# Patient Record
Sex: MALE | Race: BLACK OR AFRICAN AMERICAN | NOT HISPANIC OR LATINO | Employment: OTHER | URBAN - METROPOLITAN AREA
[De-identification: names, ages, dates, MRNs, and addresses within clinical notes are randomized per-mention and may not be internally consistent; named-entity substitution may affect disease eponyms.]

---

## 2017-01-10 ENCOUNTER — TELEPHONE (OUTPATIENT)
Dept: FAMILY MEDICINE | Facility: CLINIC | Age: 67
End: 2017-01-10

## 2017-01-10 NOTE — TELEPHONE ENCOUNTER
Spoke to patient, informed him hydroxyzine not covered by insurance. Patient states he is sleeping all right and he will call if he have trouble sleeping

## 2017-01-30 ENCOUNTER — LAB VISIT (OUTPATIENT)
Dept: LAB | Facility: HOSPITAL | Age: 67
End: 2017-01-30
Attending: INTERNAL MEDICINE
Payer: MEDICARE

## 2017-01-30 DIAGNOSIS — C23 GALLBLADDER CANCER: ICD-10-CM

## 2017-01-30 LAB
ALBUMIN SERPL BCP-MCNC: 4.1 G/DL
ALP SERPL-CCNC: 71 U/L
ALT SERPL W/O P-5'-P-CCNC: 7 U/L
ANION GAP SERPL CALC-SCNC: 7 MMOL/L
ANISOCYTOSIS BLD QL SMEAR: SLIGHT
AST SERPL-CCNC: 16 U/L
BASOPHILS # BLD AUTO: 0.02 K/UL
BASOPHILS NFR BLD: 0.3 %
BILIRUB SERPL-MCNC: 0.5 MG/DL
BUN SERPL-MCNC: 14 MG/DL
CALCIUM SERPL-MCNC: 9.7 MG/DL
CEA SERPL-MCNC: 3.4 NG/ML
CHLORIDE SERPL-SCNC: 105 MMOL/L
CO2 SERPL-SCNC: 28 MMOL/L
CREAT SERPL-MCNC: 1.5 MG/DL
DIFFERENTIAL METHOD: ABNORMAL
EOSINOPHIL # BLD AUTO: 0.4 K/UL
EOSINOPHIL NFR BLD: 6.2 %
ERYTHROCYTE [DISTWIDTH] IN BLOOD BY AUTOMATED COUNT: 15.5 %
EST. GFR  (AFRICAN AMERICAN): 55 ML/MIN/1.73 M^2
EST. GFR  (NON AFRICAN AMERICAN): 48 ML/MIN/1.73 M^2
GLUCOSE SERPL-MCNC: 94 MG/DL
HCT VFR BLD AUTO: 37.9 %
HGB BLD-MCNC: 12.2 G/DL
HYPOCHROMIA BLD QL SMEAR: ABNORMAL
LYMPHOCYTES # BLD AUTO: 1.9 K/UL
LYMPHOCYTES NFR BLD: 30.8 %
MCH RBC QN AUTO: 21.7 PG
MCHC RBC AUTO-ENTMCNC: 32.2 %
MCV RBC AUTO: 67 FL
MONOCYTES # BLD AUTO: 0.7 K/UL
MONOCYTES NFR BLD: 11.7 %
NEUTROPHILS # BLD AUTO: 3.1 K/UL
NEUTROPHILS NFR BLD: 51 %
OVALOCYTES BLD QL SMEAR: ABNORMAL
PLATELET # BLD AUTO: 266 K/UL
PMV BLD AUTO: 9.2 FL
POIKILOCYTOSIS BLD QL SMEAR: SLIGHT
POTASSIUM SERPL-SCNC: 4.5 MMOL/L
PROT SERPL-MCNC: 7.8 G/DL
RBC # BLD AUTO: 5.63 M/UL
SODIUM SERPL-SCNC: 140 MMOL/L
WBC # BLD AUTO: 6.14 K/UL

## 2017-01-30 PROCEDURE — 80053 COMPREHEN METABOLIC PANEL: CPT

## 2017-01-30 PROCEDURE — 82378 CARCINOEMBRYONIC ANTIGEN: CPT

## 2017-01-30 PROCEDURE — 86301 IMMUNOASSAY TUMOR CA 19-9: CPT

## 2017-01-30 PROCEDURE — 85025 COMPLETE CBC W/AUTO DIFF WBC: CPT

## 2017-01-30 PROCEDURE — 36415 COLL VENOUS BLD VENIPUNCTURE: CPT

## 2017-01-31 LAB — CANCER AG19-9 SERPL-ACNC: 12 U/ML

## 2017-02-03 ENCOUNTER — OFFICE VISIT (OUTPATIENT)
Dept: HEMATOLOGY/ONCOLOGY | Facility: CLINIC | Age: 67
End: 2017-02-03
Payer: MEDICARE

## 2017-02-03 VITALS
TEMPERATURE: 99 F | BODY MASS INDEX: 32 KG/M2 | OXYGEN SATURATION: 97 % | HEIGHT: 69 IN | WEIGHT: 216.06 LBS | SYSTOLIC BLOOD PRESSURE: 116 MMHG | DIASTOLIC BLOOD PRESSURE: 62 MMHG | HEART RATE: 70 BPM

## 2017-02-03 DIAGNOSIS — C23 GALLBLADDER CANCER: Primary | ICD-10-CM

## 2017-02-03 PROCEDURE — 3074F SYST BP LT 130 MM HG: CPT | Mod: S$GLB,,, | Performed by: INTERNAL MEDICINE

## 2017-02-03 PROCEDURE — 99999 PR PBB SHADOW E&M-EST. PATIENT-LVL III: CPT | Mod: PBBFAC,,, | Performed by: INTERNAL MEDICINE

## 2017-02-03 PROCEDURE — 3078F DIAST BP <80 MM HG: CPT | Mod: S$GLB,,, | Performed by: INTERNAL MEDICINE

## 2017-02-03 PROCEDURE — 1126F AMNT PAIN NOTED NONE PRSNT: CPT | Mod: S$GLB,,, | Performed by: INTERNAL MEDICINE

## 2017-02-03 PROCEDURE — 1157F ADVNC CARE PLAN IN RCRD: CPT | Mod: S$GLB,,, | Performed by: INTERNAL MEDICINE

## 2017-02-03 PROCEDURE — 1159F MED LIST DOCD IN RCRD: CPT | Mod: S$GLB,,, | Performed by: INTERNAL MEDICINE

## 2017-02-03 PROCEDURE — 1160F RVW MEDS BY RX/DR IN RCRD: CPT | Mod: S$GLB,,, | Performed by: INTERNAL MEDICINE

## 2017-02-03 PROCEDURE — 99213 OFFICE O/P EST LOW 20 MIN: CPT | Mod: S$GLB,,, | Performed by: INTERNAL MEDICINE

## 2017-02-03 NOTE — MR AVS SNAPSHOT
[facility]  [address] [place] [address]  Phone: [phone]                  Evens Aguilar   2/3/2017 3:00 PM   Office Visit    Description:  Male : 1950   Provider:  Tish Rodriguez MD   Department:  [facility]           Reason for Visit     Follow-up           Diagnoses this Visit        Comments    Gallbladder cancer    -  Primary            To Do List           Future Appointments        Provider Department Dept Phone    2017 2:30 PM LAB, [facility] [phone]    2017 3:00 PM Tish Rodriguez MD [facility] [phone]      Goals (5 Years of Data)     None      Follow-Up and Disposition     Return in about 2 months (around 4/3/2017).      [facility]     [facility] -  Assistance  Registered nurses in the [facility] provide clinical advisement, health education, appointment booking, and other advisory services.  Call for this free service at [phone].             Medications           Message regarding Medications     Verify the changes and/or additions to your medication regime listed below are the same as discussed with your clinician today.  If any of these changes or additions are incorrect, please notify your healthcare provider.             Verify that the below list of medications is an accurate representation of the medications you are currently taking.  If none reported, the list may be blank. If incorrect, please contact your healthcare provider. Carry this list with you in case of emergency.           Current Medications     albuterol-ipratropium 2.5mg-0.5mg/3mL (DUO-NEB) 0.5 mg-3 mg(2.5 mg base)/3 mL nebulizer solution Take 3 mLs by nebulization every 6 (six) hours as needed for Wheezing.    amlodipine (NORVASC) 10 MG tablet Take 1 tablet (10 mg total) by mouth once daily.    blood sugar diagnostic (TRUETEST TEST  "STRIPS) Strp CHECK SUGAR DAILY. TESTING STRIPS AND LANCETS    blood-glucose meter (FREESTYLE SYSTEM KIT) kit Use as instructed. TRUE TEST GLUCOMETER. NOT FREESTLYE.    budesonide-formoterol 160-4.5 mcg (SYMBICORT) 160-4.5 mcg/actuation HFAA Inhale 2 puffs into the lungs every 12 (twelve) hours. GENERIC EQUIVALENT IS OKAY    folic acid (FOLVITE) 1 MG tablet Take 1 tablet (1,000 mcg total) by mouth once daily.    glimepiride (AMARYL) 1 MG tablet Take 1 tablet (1 mg total) by mouth before breakfast.    hydrOXYzine pamoate (VISTARIL) 25 MG Cap Take 1 capsule (25 mg total) by mouth nightly as needed.    pravastatin (PRAVACHOL) 20 MG tablet Take 1 tablet (20 mg total) by mouth once daily.    tamsulosin (FLOMAX) 0.4 mg Cp24 TAKE 1 CAPSULE(S) BY MOUTH daily    isosorbide mononitrate (IMDUR) 30 MG 24 hr tablet TAKE 1 TABLET(S) BY MOUTH daily           Clinical Reference Information           Your Vitals Were     BP Pulse Temp Height Weight SpO2    116/62 (BP Location: Left arm, Patient Position: Sitting, BP Method: Manual) 70 98.6 °F (37 °C) (Oral) 5' 9" (1.753 m) 98 kg (216 lb 0.8 oz) 97%    BMI                31.91 kg/m2          Blood Pressure          Most Recent Value    BP  116/62      Allergies as of 2/3/2017     No Known Allergies      Immunizations Administered on Date of Encounter - 2/3/2017     None      Orders Placed During Today's Visit     Future Labs/Procedures Expected by Expires    CBC auto differential  2/3/2017 2/3/2018    Comprehensive metabolic panel  2/3/2017 2/3/2018    Ferritin  2/3/2017 2/3/2018    Iron and TIBC  2/3/2017 2/3/2018      Language Assistance Services     ATTENTION: Language assistance services are available, free of charge. Please call [phone].      ATENCIÓN: Si habla miguel ángelya, tiene a paul disposición servicios gratuitos de asistencia lingüística. Llame al [phone].     CHÚ Ý: N?u b?n nói Ti?ng Vi?t, có các d?ch v? h? tr? ngôn ng? mi?n phí dành cho b?n. G?i s? " [phone].         [facility] complies with applicable Federal civil rights laws and does not discriminate on the basis of race, color, national origin, age, disability, or sex.

## 2017-03-17 RX ORDER — AMLODIPINE BESYLATE 10 MG/1
TABLET ORAL
Qty: 90 TABLET | Refills: 0 | Status: SHIPPED | OUTPATIENT
Start: 2017-03-17 | End: 2017-07-19 | Stop reason: SDUPTHER

## 2017-03-17 RX ORDER — PRAVASTATIN SODIUM 20 MG/1
TABLET ORAL
Qty: 90 TABLET | Refills: 0 | Status: SHIPPED | OUTPATIENT
Start: 2017-03-17 | End: 2017-06-05 | Stop reason: SDUPTHER

## 2017-03-22 ENCOUNTER — OFFICE VISIT (OUTPATIENT)
Dept: FAMILY MEDICINE | Facility: CLINIC | Age: 67
End: 2017-03-22
Payer: MEDICARE

## 2017-03-22 VITALS
SYSTOLIC BLOOD PRESSURE: 130 MMHG | BODY MASS INDEX: 30.9 KG/M2 | DIASTOLIC BLOOD PRESSURE: 70 MMHG | WEIGHT: 215.81 LBS | HEIGHT: 70 IN | OXYGEN SATURATION: 97 % | TEMPERATURE: 99 F | HEART RATE: 68 BPM

## 2017-03-22 DIAGNOSIS — D56.0 ALPHA-THALASSEMIA: ICD-10-CM

## 2017-03-22 DIAGNOSIS — D57.3 SICKLE-CELL TRAIT: Primary | ICD-10-CM

## 2017-03-22 DIAGNOSIS — E11.9 TYPE 2 DIABETES MELLITUS WITHOUT COMPLICATION, WITHOUT LONG-TERM CURRENT USE OF INSULIN: ICD-10-CM

## 2017-03-22 DIAGNOSIS — E11.29 TYPE II OR UNSPECIFIED TYPE DIABETES MELLITUS WITH RENAL MANIFESTATIONS, NOT STATED AS UNCONTROLLED(250.40): ICD-10-CM

## 2017-03-22 DIAGNOSIS — J44.1 COPD EXACERBATION: ICD-10-CM

## 2017-03-22 DIAGNOSIS — F10.20 ALCOHOLISM: ICD-10-CM

## 2017-03-22 PROCEDURE — 1159F MED LIST DOCD IN RCRD: CPT | Mod: S$GLB,,, | Performed by: FAMILY MEDICINE

## 2017-03-22 PROCEDURE — 1157F ADVNC CARE PLAN IN RCRD: CPT | Mod: S$GLB,,, | Performed by: FAMILY MEDICINE

## 2017-03-22 PROCEDURE — 3044F HG A1C LEVEL LT 7.0%: CPT | Mod: S$GLB,,, | Performed by: FAMILY MEDICINE

## 2017-03-22 PROCEDURE — 3075F SYST BP GE 130 - 139MM HG: CPT | Mod: S$GLB,,, | Performed by: FAMILY MEDICINE

## 2017-03-22 PROCEDURE — 99499 UNLISTED E&M SERVICE: CPT | Mod: S$GLB,,, | Performed by: FAMILY MEDICINE

## 2017-03-22 PROCEDURE — 3078F DIAST BP <80 MM HG: CPT | Mod: S$GLB,,, | Performed by: FAMILY MEDICINE

## 2017-03-22 PROCEDURE — 99214 OFFICE O/P EST MOD 30 MIN: CPT | Mod: S$GLB,,, | Performed by: FAMILY MEDICINE

## 2017-03-22 PROCEDURE — 99999 PR PBB SHADOW E&M-EST. PATIENT-LVL III: CPT | Mod: PBBFAC,,, | Performed by: FAMILY MEDICINE

## 2017-03-22 PROCEDURE — 4010F ACE/ARB THERAPY RXD/TAKEN: CPT | Mod: S$GLB,,, | Performed by: FAMILY MEDICINE

## 2017-03-22 PROCEDURE — 1160F RVW MEDS BY RX/DR IN RCRD: CPT | Mod: S$GLB,,, | Performed by: FAMILY MEDICINE

## 2017-03-22 RX ORDER — LISINOPRIL 2.5 MG/1
2.5 TABLET ORAL DAILY
Qty: 90 TABLET | Refills: 1 | Status: SHIPPED | OUTPATIENT
Start: 2017-03-22 | End: 2017-11-01 | Stop reason: SDUPTHER

## 2017-03-22 NOTE — PROGRESS NOTES
"Subjective:       Patient ID: Evens Aguilar is a 67 y.o. male.    Chief Complaint: 3 month Diabetes Follow Up    HPI Comments:   Subjective:    Evens Aguilar is a 67 y.o. male who presents for follow-up of Type 2 diabetes mellitus.  Current symptoms/problems include none and have been stable. Symptoms have been present for N/A . He denies polyuria, polypdipisia, nausea, vomiting.     Known diabetic complications: nephropathy  Cardiovascular risk factors: advanced age (older than 55 for men, 65 for women), diabetes mellitus, dyslipidemia, male gender, obesity (BMI >= 30 kg/m2) and sedentary lifestyle  Current diabetic medications include oral agent (monotherapy): glimepiride 1 mg daily.   Eye exam current (within one year): yes; he is due for an exam. It is scheduled on 4/11/17  Weight trend: stable  Prior visit with dietician: yes -   Current diet: in general, a "healthy" diet    Current exercise: exercising at [facility] gym    Current monitoring regimen: home blood tests - one times daily  Home blood sugar records: fasting range: 100-s-110's  Any episodes of hypoglycemia? no    Is He on ACE inhibitor or angiotensin II receptor blocker?   No       Review of Systems    Objective:       Vitals:    03/22/17 1513   BP: 130/70   Pulse: 68   Temp: 99 °F (37.2 °C)   TempSrc: Oral   SpO2: 97%   Weight: 97.9 kg (215 lb 13.3 oz)   Height: 5' 10" (1.778 m)       Physical Exam   Constitutional: He is oriented to person, place, and time. He appears well-developed and well-nourished. No distress.   HENT:   Head: Normocephalic and atraumatic.   Eyes: Conjunctivae are normal.   Neck: Normal range of motion. Neck supple. Carotid bruit is not present.   Cardiovascular: Normal rate, regular rhythm and normal heart sounds.  Exam reveals no gallop and no friction rub.    No murmur heard.  Pulmonary/Chest: Effort normal and breath sounds normal. No respiratory distress. He has no wheezes. He has no rales.   Musculoskeletal: " He exhibits no edema.   Lymphadenopathy:     He has no cervical adenopathy.   Neurological: He is alert and oriented to person, place, and time.   Skin: He is not diaphoretic.   Psychiatric: He has a normal mood and affect.         Protective Sensation (w/ 10 gram monofilament):  Right: Intact  Left: Intact    Visual Inspection:  Nails Intact - without Evidence of Foot Deformity- Bilateral, Dry Skin -  Bilateral and Onychomycosis -  Bilateral    Pedal Pulses:   Right: Present  Left: Present    Posterior tibialis:   Right:Present  Left: Present      Assessment:       1. Sickle-cell trait    2. Type 2 diabetes mellitus without complication, without long-term current use of insulin    3. Alpha-thalassemia    4. Type II or unspecified type diabetes mellitus with renal manifestations, not stated as uncontrolled    5. Alcoholism    6. COPD exacerbation        Plan:           Evens was seen today for 3 month diabetes follow up.    Diagnoses and all orders for this visit:    Sickle-cell trait  Stable    Type 2 diabetes mellitus without complication, without long-term current use of insulin  -     Comprehensive metabolic panel; Future  -     Lipid panel; Future  -     Microalbumin/creatinine urine ratio; Future  -     Hemoglobin A1c; Future  -     lisinopril (PRINIVIL,ZESTRIL) 2.5 MG tablet; Take 1 tablet (2.5 mg total) by mouth once daily.  Ordered labs and starting him on lisinopril to p. rotect his kidneys. Foot exam was one was done today    Alpha-thalassemia  Stable    Type II or unspecified type diabetes mellitus with renal manifestations, not stated as uncontrolled  -     Comprehensive metabolic panel; Future  -     Microalbumin/creatinine urine ratio; Future    Alcoholism  -     Comprehensive metabolic panel; Future  Will check liver enzymes    COPD exacerbation  Continue combivent prn and symbicort daily     Other orders  -     lisinopril (PRINIVIL,ZESTRIL) 2.5 MG tablet; Take 1 tablet (2.5 mg total) by mouth once  daily.

## 2017-03-22 NOTE — MR AVS SNAPSHOT
[facility]  [address]  [address]  [place] [address]  Phone: [phone]  Fax: [phone]                  Evens Aguilar   3/22/2017 3:00 PM   Office Visit    Description:  Male : 1950   Provider:  Anahy Matthew MD   Department:  [facility]           Reason for Visit     3 month Diabetes Follow Up           Diagnoses this Visit        Comments    Sickle-cell trait    -  Primary     Type 2 diabetes mellitus without complication, without long-term current use of insulin         Alpha-thalassemia         Type II or unspecified type diabetes mellitus with renal manifestations, not stated as uncontrolled         Alcoholism         COPD exacerbation                To Do List           Future Appointments        Provider Department Dept Phone    2017 2:30 PM [facility] [phone]    2017 3:00 PM Tish Rodriguez MD [facility] [phone]    2017 2:30 PM Nathaniel Calhoun OD [place] [facility] [phone]      Goals (5 Years of Data)     None      Follow-Up and Disposition     Return in about 2 months (around 2017) for labs.       These Medications        Disp Refills Start End    lisinopril (PRINIVIL,ZESTRIL) 2.5 MG tablet 90 tablet 1 3/22/2017 3/22/2018    Take 1 tablet (2.5 mg total) by mouth once daily. - Oral    [facility] Ph #: [phone]         [facility]     [facility] -  Assistance  Registered nurses in the [facility] provide clinical advisement, health education, appointment booking, and other advisory services.  Call for this free service at [phone].             Medications           Message regarding Medications     Verify the changes and/or additions to your medication regime listed below are the same as discussed with your clinician today.  If any  of these changes or additions are incorrect, please notify your healthcare provider.        START taking these NEW medications        Refills    lisinopril (PRINIVIL,ZESTRIL) 2.5 MG tablet 1    Sig: Take 1 tablet (2.5 mg total) by mouth once daily.    Class: Normal    Route: Oral           Verify that the below list of medications is an accurate representation of the medications you are currently taking.  If none reported, the list may be blank. If incorrect, please contact your healthcare provider. Carry this list with you in case of emergency.           Current Medications     albuterol-ipratropium 2.5mg-0.5mg/3mL (DUO-NEB) 0.5 mg-3 mg(2.5 mg base)/3 mL nebulizer solution Take 3 mLs by nebulization every 6 (six) hours as needed for Wheezing.    amlodipine (NORVASC) 10 MG tablet TAKE ONE TABLET BY MOUTH ONCE DAILY    blood sugar diagnostic (TRUETEST TEST STRIPS) Strp CHECK SUGAR DAILY. TESTING STRIPS AND LANCETS    blood-glucose meter (FREESTYLE SYSTEM KIT) kit Use as instructed. TRUE TEST GLUCOMETER. NOT FREESTLYE.    budesonide-formoterol 160-4.5 mcg (SYMBICORT) 160-4.5 mcg/actuation HFAA Inhale 2 puffs into the lungs every 12 (twelve) hours. GENERIC EQUIVALENT IS OKAY    folic acid (FOLVITE) 1 MG tablet Take 1 tablet (1,000 mcg total) by mouth once daily.    glimepiride (AMARYL) 1 MG tablet Take 1 tablet (1 mg total) by mouth before breakfast.    hydrOXYzine pamoate (VISTARIL) 25 MG Cap Take 1 capsule (25 mg total) by mouth nightly as needed.    isosorbide mononitrate (IMDUR) 30 MG 24 hr tablet TAKE 1 TABLET(S) BY MOUTH daily    pravastatin (PRAVACHOL) 20 MG tablet TAKE ONE TABLET BY MOUTH ONCE DAILY    tamsulosin (FLOMAX) 0.4 mg Cp24 TAKE 1 CAPSULE(S) BY MOUTH daily    lisinopril (PRINIVIL,ZESTRIL) 2.5 MG tablet Take 1 tablet (2.5 mg total) by mouth once daily.           Clinical Reference Information           Your Vitals Were     BP Pulse Temp Height Weight SpO2    130/70 (BP Location: Right arm, Patient  "Position: Sitting, BP Method: Manual) 68 99 °F (37.2 °C) (Oral) 5' 10" (1.778 m) 97.9 kg (215 lb 13.3 oz) 97%    BMI                30.97 kg/m2          Blood Pressure          Most Recent Value    BP  130/70      Allergies as of 3/22/2017     No Known Allergies      Immunizations Administered on Date of Encounter - 3/22/2017     None      Orders Placed During Today's Visit     Future Labs/Procedures Expected by Expires    Comprehensive metabolic panel  3/22/2017 5/21/2018    Hemoglobin A1c  3/22/2017 5/21/2018    Lipid panel  3/22/2017 5/21/2018    Microalbumin/creatinine urine ratio  5/22/2017 5/21/2018      Language Assistance Services     ATTENTION: Language assistance services are available, free of charge. Please call [phone].      ATENCIÓN: Si tamy mccall, tiene a paul disposición servicios gratuitos de asistencia lingüística. Llame al [phone].     CHÚ Ý: N?u b?n nói Ti?ng Vi?t, có các d?ch v? h? tr? ngôn ng? mi?n phí dành cho b?n. G?i s? [phone].         [facility] complies with applicable Federal civil rights laws and does not discriminate on the basis of race, color, national origin, age, disability, or sex.        "

## 2017-04-04 ENCOUNTER — LAB VISIT (OUTPATIENT)
Dept: LAB | Facility: HOSPITAL | Age: 67
End: 2017-04-04
Attending: INTERNAL MEDICINE
Payer: MEDICARE

## 2017-04-04 DIAGNOSIS — C23 GALLBLADDER CANCER: ICD-10-CM

## 2017-04-04 LAB
ALBUMIN SERPL BCP-MCNC: 4.1 G/DL
ALP SERPL-CCNC: 58 U/L
ALT SERPL W/O P-5'-P-CCNC: 6 U/L
ANION GAP SERPL CALC-SCNC: 9 MMOL/L
AST SERPL-CCNC: 18 U/L
BASOPHILS # BLD AUTO: 0.05 K/UL
BASOPHILS NFR BLD: 0.9 %
BILIRUB SERPL-MCNC: 0.9 MG/DL
BUN SERPL-MCNC: 17 MG/DL
CALCIUM SERPL-MCNC: 9.9 MG/DL
CHLORIDE SERPL-SCNC: 104 MMOL/L
CO2 SERPL-SCNC: 26 MMOL/L
CREAT SERPL-MCNC: 1.6 MG/DL
DIFFERENTIAL METHOD: ABNORMAL
EOSINOPHIL # BLD AUTO: 0.3 K/UL
EOSINOPHIL NFR BLD: 5.2 %
ERYTHROCYTE [DISTWIDTH] IN BLOOD BY AUTOMATED COUNT: 14.9 %
EST. GFR  (AFRICAN AMERICAN): 51 ML/MIN/1.73 M^2
EST. GFR  (NON AFRICAN AMERICAN): 44 ML/MIN/1.73 M^2
FERRITIN SERPL-MCNC: 93 NG/ML
GLUCOSE SERPL-MCNC: 105 MG/DL
HCT VFR BLD AUTO: 40.3 %
HGB BLD-MCNC: 13.1 G/DL
IRON SERPL-MCNC: 83 UG/DL
LYMPHOCYTES # BLD AUTO: 1.9 K/UL
LYMPHOCYTES NFR BLD: 35.8 %
MCH RBC QN AUTO: 21.5 PG
MCHC RBC AUTO-ENTMCNC: 32.5 %
MCV RBC AUTO: 66 FL
MONOCYTES # BLD AUTO: 0.4 K/UL
MONOCYTES NFR BLD: 7.3 %
NEUTROPHILS # BLD AUTO: 2.7 K/UL
NEUTROPHILS NFR BLD: 50.8 %
PLATELET # BLD AUTO: 301 K/UL
PMV BLD AUTO: 9.4 FL
POIKILOCYTOSIS BLD QL SMEAR: SLIGHT
POLYCHROMASIA BLD QL SMEAR: ABNORMAL
POTASSIUM SERPL-SCNC: 4.3 MMOL/L
PROT SERPL-MCNC: 8.2 G/DL
RBC # BLD AUTO: 6.09 M/UL
SATURATED IRON: 27 %
SODIUM SERPL-SCNC: 139 MMOL/L
TOTAL IRON BINDING CAPACITY: 303 UG/DL
TRANSFERRIN SERPL-MCNC: 205 MG/DL
WBC # BLD AUTO: 5.36 K/UL

## 2017-04-04 PROCEDURE — 82728 ASSAY OF FERRITIN: CPT

## 2017-04-04 PROCEDURE — 85025 COMPLETE CBC W/AUTO DIFF WBC: CPT

## 2017-04-04 PROCEDURE — 80053 COMPREHEN METABOLIC PANEL: CPT

## 2017-04-04 PROCEDURE — 83540 ASSAY OF IRON: CPT

## 2017-04-04 PROCEDURE — 36415 COLL VENOUS BLD VENIPUNCTURE: CPT

## 2017-04-06 ENCOUNTER — OFFICE VISIT (OUTPATIENT)
Dept: HEMATOLOGY/ONCOLOGY | Facility: CLINIC | Age: 67
End: 2017-04-06
Payer: MEDICARE

## 2017-04-06 VITALS
HEIGHT: 69 IN | TEMPERATURE: 99 F | DIASTOLIC BLOOD PRESSURE: 76 MMHG | BODY MASS INDEX: 31.87 KG/M2 | SYSTOLIC BLOOD PRESSURE: 138 MMHG | OXYGEN SATURATION: 94 % | HEART RATE: 73 BPM | WEIGHT: 215.19 LBS

## 2017-04-06 DIAGNOSIS — C23 PRIMARY CANCER OF GALLBLADDER WITH METASTASIS TO OTHER SITE: Primary | ICD-10-CM

## 2017-04-06 PROCEDURE — 99213 OFFICE O/P EST LOW 20 MIN: CPT | Mod: S$GLB,,, | Performed by: INTERNAL MEDICINE

## 2017-04-06 PROCEDURE — 1157F ADVNC CARE PLAN IN RCRD: CPT | Mod: S$GLB,,, | Performed by: INTERNAL MEDICINE

## 2017-04-06 PROCEDURE — 1159F MED LIST DOCD IN RCRD: CPT | Mod: S$GLB,,, | Performed by: INTERNAL MEDICINE

## 2017-04-06 PROCEDURE — 1126F AMNT PAIN NOTED NONE PRSNT: CPT | Mod: S$GLB,,, | Performed by: INTERNAL MEDICINE

## 2017-04-06 PROCEDURE — 3075F SYST BP GE 130 - 139MM HG: CPT | Mod: S$GLB,,, | Performed by: INTERNAL MEDICINE

## 2017-04-06 PROCEDURE — 3078F DIAST BP <80 MM HG: CPT | Mod: S$GLB,,, | Performed by: INTERNAL MEDICINE

## 2017-04-06 PROCEDURE — 1160F RVW MEDS BY RX/DR IN RCRD: CPT | Mod: S$GLB,,, | Performed by: INTERNAL MEDICINE

## 2017-04-06 PROCEDURE — 99999 PR PBB SHADOW E&M-EST. PATIENT-LVL IV: CPT | Mod: PBBFAC,,, | Performed by: INTERNAL MEDICINE

## 2017-04-06 NOTE — PROGRESS NOTES
Subjective:       Patient ID: Evens Aguilar is a 67 y.o. male.    Chief Complaint: Follow-up    HPI   HISTORY OF PRESENT ILLNESS:  The patient is a 66-year-old gentleman seen today for followup for  gallbladder carcinoma. Her presented with   upper epigastric pain and shortness of breath.Labs reveal elevated LFT.  CT revealed no signs of obstruction.  HIDA scan consistent with acute cholecystitis.  The patient was treated with IV antibiotictherapy.  Surgery following the patient.  He underwent an open cholecystectomy.Pathology revealed adenocarcinoma T2NO( 1 LN resected). CT of the chest revealed a nodule concerning for met.Pt did not undergo planned CT guided bx of lung - review of lesion non-malignant  The patient had abnormal cholangiogram on surgery.  It was determined patient is not a candidate for aggressive surgical resection at this time. S/p EUS/EGD. Liver lesion. Benign.  Plan was for ERCP with spyglass to further evaluate duct. Pt declined.Pt also declined/not interested in undergoing chemo.      Today, he is doing well.  No complaints  No N/V/abd pain   Appetite and weight remain stable  No SOB/CP  No fever        Past Medical History:   Diagnosis Date    Alpha thalassemia     Asthma     CKD (chronic kidney disease) stage 3, GFR 30-59 ml/min     COPD (chronic obstructive pulmonary disease)     Diabetes mellitus     Diabetes mellitus type II     Hx-TIA (transient ischemic attack)     Hyperlipemia     Hypertension     Obesity     Sickle cell trait        Past Surgical History:   Procedure Laterality Date    CHOLECYSTECTOMY  2016 Sept.    LUNG SURGERY      chest tube placement d/t collapsed lung     SOCIAL HISTORY:  He is a former smoker.  He has a 15-pack-year history.  ETOH use recently.  No history of IV drug abuse.  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    SOCIAL HISTORY:  He is a former smoker.  He has a 15-pack-year history.  ETOH   use  "recently.  No history of IV drug abuse.  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   HENT: Negative for congestion, hearing loss, mouth sores and nosebleeds.    Eyes: Negative for visual disturbance.   Respiratory: Negative for cough, chest tightness, shortness of breath and wheezing.    Cardiovascular: Negative for chest pain, palpitations and leg swelling.   Gastrointestinal: Negative for abdominal distention, abdominal pain, blood in stool, diarrhea, nausea and vomiting.   Genitourinary: Negative for dysuria, flank pain, frequency, hematuria and urgency.   Musculoskeletal: Negative for arthralgias, gait problem and joint swelling.   Skin: Negative for rash.        No ecchymoses, petechiae   Neurological: Negative for dizziness, tremors, seizures, syncope, weakness, light-headedness, numbness and headaches.   Hematological: Negative for adenopathy. Does not bruise/bleed easily.   Psychiatric/Behavioral: Negative for confusion. The patient is not nervous/anxious.        Objective:     ECOG 0  Vitals:    04/06/17 1454   BP: 138/76   BP Location: Right arm   Patient Position: Sitting   BP Method: Manual   Pulse: 73   Temp: 98.9 °F (37.2 °C)   TempSrc: Oral   SpO2: (!) 94%   Weight: 97.6 kg (215 lb 2.7 oz)   Height: 5' 9" (1.753 m)       Physical Exam   Constitutional: He is oriented to person, place, and time. He appears well-developed and well-nourished.   HENT:   Head: Normocephalic.   Mouth/Throat: Oropharynx is clear and moist. No oropharyngeal exudate.   Eyes: No scleral icterus.   Neck: Normal range of motion. Neck supple. No thyromegaly present.   Cardiovascular: Normal rate, regular rhythm and normal heart sounds.    No murmur heard.  Pulmonary/Chest: Effort normal and breath sounds normal. He has no wheezes. He has no rales.   Abdominal: Soft. Bowel " sounds are normal. He exhibits no distension and no mass. There is no hepatosplenomegaly. There is no tenderness. There is no rebound and no guarding.   Musculoskeletal: Normal range of motion. He exhibits no edema.   Lymphadenopathy:     He has no cervical adenopathy.     He has no axillary adenopathy.        Right: No supraclavicular adenopathy present.        Left: No supraclavicular adenopathy present.   Neurological: He is alert and oriented to person, place, and time. No cranial nerve deficit.   Skin: No rash noted. No erythema.   Psychiatric: He has a normal mood and affect.           HIDA suggestive of acute cholecystitis    Cholangiogram  Multiple radiographs demonstrate contrast injected into the cystic duct and extending into the extrahepatic common bile duct as well as intrahepatic biliary ducts.  No filling defects are noted.  There is extension of the contrast into the duodenum    CT chest There is a 1.7 cm noncalcified left lung base pulmonary nodule concerning for metastatic disease.    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 Acute necrotizing  cholecystitis (gangrenous cholecystitis). See synoptic report.  Synoptic report-carcinoma of the gallbladder.  Specimen-gallbladder  Procedure-simple cholecystectomy  Tumor site-body and fundus  Tumor size-3.5 cm in greatest dimension  Histologic type- adenocarcinoma, papillary adenocarcinoma  Histologic grade-G2: Moderately differentiated  Microscopic tumor extension-tumor invades perimuscular connective tissue.  Margins-cannot be assessed. (A sections submitted as gallbladder neck margin consistent of a medium -sized  artery and surrounding inflamed fibrofatty tissue. No gallbladder mucosa and/or muscular wall is present for  evaluation). The specimen was reexamined grossly, however, the surgical margin was not able to be identified with  certainty.  Pathologic staging-pT2.  Regional lymph  nodes-pN0. One lymph node examined. 0 lymph nodes involved (1 of 1 lymph nodes are free of  tumor)  Additional pathologic findings-acute gangrenous cholecystitis.    Assessment:       1. 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    S/p  EUS/EGD 11/2016    FNA of liver - benign.   Pt declined/not interested in chemotherapy  Cont to monitor    F/u 2 mos with cbc,cmp prior to f/u     CC:Anahy Matthew MD

## 2017-04-06 NOTE — MR AVS SNAPSHOT
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      [facility]     [facility] Nurse Care [facility] -  Assistance  Unless otherwise directed by your provider, please contact [facility], our nurse care line that is available for  assistance.     Registered nurses in the [facility] provide: appointment scheduling, clinical advisement, health education, and other advisory services.  Call: [phone] (toll free)               Medications           Message regarding Medications     Verify the changes and/or additions to your medication regime listed below are the same as discussed with your clinician today.  If any of these changes or additions are incorrect, please notify your healthcare provider.             Verify that the below list of medications is an accurate representation of the medications you are currently taking.  If none reported, the list may be blank. If incorrect, please contact your healthcare provider. Carry this list with you in case of emergency.  "          Current Medications     albuterol-ipratropium 2.5mg-0.5mg/3mL (DUO-NEB) 0.5 mg-3 mg(2.5 mg base)/3 mL nebulizer solution Take 3 mLs by nebulization every 6 (six) hours as needed for Wheezing.    amlodipine (NORVASC) 10 MG tablet TAKE ONE TABLET BY MOUTH ONCE DAILY    blood sugar diagnostic (TRUETEST TEST STRIPS) Strp CHECK SUGAR DAILY. TESTING STRIPS AND LANCETS    blood-glucose meter (FREESTYLE SYSTEM KIT) kit Use as instructed. TRUE TEST GLUCOMETER. NOT FREESTLYE.    budesonide-formoterol 160-4.5 mcg (SYMBICORT) 160-4.5 mcg/actuation HFAA Inhale 2 puffs into the lungs every 12 (twelve) hours. GENERIC EQUIVALENT IS OKAY    folic acid (FOLVITE) 1 MG tablet Take 1 tablet (1,000 mcg total) by mouth once daily.    glimepiride (AMARYL) 1 MG tablet Take 1 tablet (1 mg total) by mouth before breakfast.    hydrOXYzine pamoate (VISTARIL) 25 MG Cap Take 1 capsule (25 mg total) by mouth nightly as needed.    isosorbide mononitrate (IMDUR) 30 MG 24 hr tablet TAKE 1 TABLET(S) BY MOUTH daily    lisinopril (PRINIVIL,ZESTRIL) 2.5 MG tablet Take 1 tablet (2.5 mg total) by mouth once daily.    pravastatin (PRAVACHOL) 20 MG tablet TAKE ONE TABLET BY MOUTH ONCE DAILY    tamsulosin (FLOMAX) 0.4 mg Cp24 TAKE 1 CAPSULE(S) BY MOUTH daily           Clinical Reference Information           Your Vitals Were     BP Pulse Temp Height Weight SpO2    138/76 (BP Location: Right arm, Patient Position: Sitting, BP Method: Manual) 73 98.9 °F (37.2 °C) (Oral) 5' 9" (1.753 m) 97.6 kg (215 lb 2.7 oz) 94%    BMI                31.77 kg/m2          Blood Pressure          Most Recent Value    BP  138/76      Allergies as of 4/6/2017     No Known Allergies      Immunizations Administered on Date of Encounter - 4/6/2017     None      Orders Placed During Today's Visit     Future Labs/Procedures Expected by Expires    Cancer antigen 19-9  4/6/2017 6/5/2018    CBC auto differential  4/6/2017 4/6/2018    CEA  4/6/2017 4/6/2018    Comprehensive " metabolic panel  4/6/2017 4/6/2018    CT Abdomen Pelvis  Without Contrast  4/6/2017 4/6/2018      Language Assistance Services     ATTENTION: Language assistance services are available, free of charge. Please call [phone].      ATENCIÓN: Si habla miguel ángelañol, tiene a paul disposición servicios gratuitos de asistencia lingüística. Llame al [phone].     CHÚ Ý: N?u b?n nói Ti?ng Vi?t, có các d?ch v? h? tr? ngôn ng? mi?n phí dành cho b?n. G?i s? [phone].         [facility] complies with applicable Federal civil rights laws and does not discriminate on the basis of race, color, national origin, age, disability, or sex.

## 2017-04-11 ENCOUNTER — INITIAL CONSULT (OUTPATIENT)
Dept: OPTOMETRY | Facility: CLINIC | Age: 67
End: 2017-04-11
Payer: MEDICARE

## 2017-04-11 DIAGNOSIS — Z13.5 GLAUCOMA SCREENING: ICD-10-CM

## 2017-04-11 DIAGNOSIS — H52.4 MYOPIA WITH ASTIGMATISM AND PRESBYOPIA, BILATERAL: ICD-10-CM

## 2017-04-11 DIAGNOSIS — E11.9 DIABETES MELLITUS TYPE 2 WITHOUT RETINOPATHY: Primary | ICD-10-CM

## 2017-04-11 DIAGNOSIS — H25.13 NUCLEAR SCLEROSIS, BILATERAL: ICD-10-CM

## 2017-04-11 DIAGNOSIS — H52.203 MYOPIA WITH ASTIGMATISM AND PRESBYOPIA, BILATERAL: ICD-10-CM

## 2017-04-11 DIAGNOSIS — H52.13 MYOPIA WITH ASTIGMATISM AND PRESBYOPIA, BILATERAL: ICD-10-CM

## 2017-04-11 PROCEDURE — 92014 COMPRE OPH EXAM EST PT 1/>: CPT | Mod: S$GLB,,, | Performed by: OPTOMETRIST

## 2017-04-11 PROCEDURE — 92015 DETERMINE REFRACTIVE STATE: CPT | Mod: S$GLB,,, | Performed by: OPTOMETRIST

## 2017-04-11 PROCEDURE — 99499 UNLISTED E&M SERVICE: CPT | Mod: S$GLB,,, | Performed by: OPTOMETRIST

## 2017-04-11 PROCEDURE — 99999 PR PBB SHADOW E&M-EST. PATIENT-LVL III: CPT | Mod: PBBFAC,,, | Performed by: OPTOMETRIST

## 2017-04-11 NOTE — PROGRESS NOTES
HPI     Concerns About Ocular Health    Additional comments: Pt is here for Ocular Health Check.            Comments   Pt is here for Diabetic Ocular Health Check.   Denies eye pain and f/f.   No noticeable VA changes since last visit.   No itching, burning or tearing.     Meds: No gtt    Hemoglobin A1C       Date                     Value               Ref Range             Status                11/21/2016               6.2                 4.5 - 6.2 %           Final            10/04/2016               6.2                 4.5 - 6.2 %           Final               04/02/2016               6.4 (H)             4.5 - 6.2 %           Final            ----------         Last edited by Nathaniel Calhoun, OD on 4/11/2017  2:52 PM. (History)            Assessment /Plan     For exam results, see Encounter Report.    Diabetes mellitus type 2 without retinopathy  -No retinopathy noted today.  Continued control with primary care physician and annual comprehensive eye exam.    Nuclear sclerosis, bilateral  -Educated patient on presence of cataracts at today's exam, monitor at annual dilated fundus exam. 1-3 years surgical estimate.    Glaucoma screening  -Monitor with annual eye exam and IOP check    Myopia with astigmatism and presbyopia, bilateral  -Hold 2/2 cataracts and never worn prior      RTC 1 yr

## 2017-04-11 NOTE — MR AVS SNAPSHOT
[facility]  [address]  [place] [address]  Phone: [phone]  Fax: [phone]                  Evens Aguilar   2017 2:30 PM   Initial consult    Description:  Male : 1950   Provider:  Nathaniel Calhoun OD   Department:  [facility]           Reason for Visit     Concerns About Ocular Health           Diagnoses this Visit        Comments    Diabetes mellitus type 2 without retinopathy    -  Primary     Nuclear sclerosis, bilateral         Glaucoma screening         Myopia with astigmatism and presbyopia, bilateral                To Do List           Future Appointments        Provider Department Dept Phone    2017 9:15 AM LAB[facility] [phone]    2017 9:30 AM [facility] CT1 LIMIT 400 LBS [facility] [phone]    7/10/2017 2:00 PM Tish Rodriguez MD [facility] [phone]      Goals (5 Years of Data)     None      Follow-Up and Disposition     Return in about 1 year (around 2018).      [facility]     [facility] Care [facility] -  Assistance  Unless otherwise directed by your provider, please contact [facility], our nurse care line that is available for  assistance.     Registered nurses in the [facility] provide: appointment scheduling, clinical advisement, health education, and other advisory services.  Call: [phone] (toll free)               Medications           Message regarding Medications     Verify the changes and/or additions to your medication regime listed below are the same as discussed with your clinician today.  If any of these changes or additions are incorrect, please notify your healthcare provider.             Verify that the below list of medications is an accurate representation of the medications you are currently taking.  If none reported, the list may be blank. If incorrect, please contact your healthcare  provider. Carry this list with you in case of emergency.           Current Medications     albuterol-ipratropium 2.5mg-0.5mg/3mL (DUO-NEB) 0.5 mg-3 mg(2.5 mg base)/3 mL nebulizer solution Take 3 mLs by nebulization every 6 (six) hours as needed for Wheezing.    amlodipine (NORVASC) 10 MG tablet TAKE ONE TABLET BY MOUTH ONCE DAILY    blood sugar diagnostic (TRUETEST TEST STRIPS) Strp CHECK SUGAR DAILY. TESTING STRIPS AND LANCETS    blood-glucose meter (FREESTYLE SYSTEM KIT) kit Use as instructed. TRUE TEST GLUCOMETER. NOT FREESTLYE.    budesonide-formoterol 160-4.5 mcg (SYMBICORT) 160-4.5 mcg/actuation HFAA Inhale 2 puffs into the lungs every 12 (twelve) hours. GENERIC EQUIVALENT IS OKAY    folic acid (FOLVITE) 1 MG tablet Take 1 tablet (1,000 mcg total) by mouth once daily.    glimepiride (AMARYL) 1 MG tablet Take 1 tablet (1 mg total) by mouth before breakfast.    hydrOXYzine pamoate (VISTARIL) 25 MG Cap Take 1 capsule (25 mg total) by mouth nightly as needed.    isosorbide mononitrate (IMDUR) 30 MG 24 hr tablet TAKE 1 TABLET(S) BY MOUTH daily    lisinopril (PRINIVIL,ZESTRIL) 2.5 MG tablet Take 1 tablet (2.5 mg total) by mouth once daily.    pravastatin (PRAVACHOL) 20 MG tablet TAKE ONE TABLET BY MOUTH ONCE DAILY    tamsulosin (FLOMAX) 0.4 mg Cp24 TAKE 1 CAPSULE(S) BY MOUTH daily           Clinical Reference Information           Allergies as of 4/11/2017     No Known Allergies      Immunizations Administered on Date of Encounter - 4/11/2017     None      Language Assistance Services     ATTENTION: Language assistance services are available, free of charge. Please call [phone].      ATENCIÓN: Si tamy mccall, tiene a paul disposición servicios gratuitos de asistencia lingüística. Llame al [phone].     CHÚ Ý: N?u b?n nói Ti?ng Vi?t, có các d?ch v? h? tr? ngôn ng? mi?n phí dành cho b?n. G?i s? [phone].         [facility] complies with applicable Federal civil rights laws and does not  discriminate on the basis of race, color, national origin, age, disability, or sex.

## 2017-06-05 RX ORDER — PRAVASTATIN SODIUM 20 MG/1
TABLET ORAL
Qty: 90 TABLET | Refills: 0 | Status: SHIPPED | OUTPATIENT
Start: 2017-06-05 | End: 2017-10-19 | Stop reason: SDUPTHER

## 2017-06-30 ENCOUNTER — HOSPITAL ENCOUNTER (OUTPATIENT)
Dept: RADIOLOGY | Facility: HOSPITAL | Age: 67
Discharge: HOME | End: 2017-06-30
Attending: INTERNAL MEDICINE
Payer: MEDICARE

## 2017-06-30 DIAGNOSIS — C23 PRIMARY CANCER OF GALLBLADDER WITH METASTASIS TO OTHER SITE: ICD-10-CM

## 2017-06-30 PROCEDURE — 74176 CT ABD & PELVIS W/O CONTRAST: CPT | Mod: TC

## 2017-06-30 PROCEDURE — 74176 CT ABD & PELVIS W/O CONTRAST: CPT | Mod: 26,,, | Performed by: RADIOLOGY

## 2017-07-10 ENCOUNTER — TELEPHONE (OUTPATIENT)
Dept: HEMATOLOGY/ONCOLOGY | Facility: CLINIC | Age: 67
End: 2017-07-10

## 2017-07-10 ENCOUNTER — OFFICE VISIT (OUTPATIENT)
Dept: HEMATOLOGY/ONCOLOGY | Facility: CLINIC | Age: 67
End: 2017-07-10
Payer: MEDICARE

## 2017-07-10 VITALS
SYSTOLIC BLOOD PRESSURE: 114 MMHG | BODY MASS INDEX: 30.11 KG/M2 | HEIGHT: 70 IN | OXYGEN SATURATION: 96 % | DIASTOLIC BLOOD PRESSURE: 62 MMHG | TEMPERATURE: 99 F | WEIGHT: 210.31 LBS | HEART RATE: 69 BPM

## 2017-07-10 DIAGNOSIS — C23 GALLBLADDER CANCER: Primary | ICD-10-CM

## 2017-07-10 DIAGNOSIS — C23 MALIGNANT NEOPLASM OF GALLBLADDER: Primary | ICD-10-CM

## 2017-07-10 PROCEDURE — 99213 OFFICE O/P EST LOW 20 MIN: CPT | Mod: S$GLB,,, | Performed by: INTERNAL MEDICINE

## 2017-07-10 PROCEDURE — 99999 PR PBB SHADOW E&M-EST. PATIENT-LVL IV: CPT | Mod: PBBFAC,,, | Performed by: INTERNAL MEDICINE

## 2017-07-10 PROCEDURE — 1126F AMNT PAIN NOTED NONE PRSNT: CPT | Mod: S$GLB,,, | Performed by: INTERNAL MEDICINE

## 2017-07-10 PROCEDURE — 1159F MED LIST DOCD IN RCRD: CPT | Mod: S$GLB,,, | Performed by: INTERNAL MEDICINE

## 2017-07-10 RX ORDER — IPRATROPIUM BROMIDE AND ALBUTEROL SULFATE 2.5; .5 MG/3ML; MG/3ML
SOLUTION RESPIRATORY (INHALATION)
COMMUNITY
Start: 2017-07-01 | End: 2018-02-01 | Stop reason: SDUPTHER

## 2017-07-10 NOTE — PROGRESS NOTES
Subjective:       Patient ID: Evens Aguilar is a 67 y.o. male.    Chief Complaint: Follow-up    HPI   HISTORY OF PRESENT ILLNESS:  The patient is a 66-year-old gentleman seen today for followup for  gallbladder carcinoma. Her presented with   upper epigastric pain and shortness of breath.Labs reveal elevated LFT.  CT revealed no signs of obstruction.  HIDA scan consistent with acute cholecystitis.  The patient was treated with IV antibiotictherapy.  Surgery following the patient.  He underwent an open cholecystectomy.Pathology revealed adenocarcinoma T2NO( 1 LN resected). CT of the chest revealed a nodule concerning for met.Pt did not undergo planned CT guided bx of lung - review of lesion non-malignant  The patient had abnormal cholangiogram on surgery.  It was determined patient is not a candidate for aggressive surgical resection at this time. S/p EUS/EGD. Liver lesion. Benign.  Plan was for ERCP with spyglass to further evaluate duct. Pt declined.Pt also declined/not interested in undergoing chemo.      Today, he is doing well.  No new issues.  He is in good spirits  No abd pain/N/V  Appetite and weight remain stable  No SOB/CP  No fever        Past Medical History:   Diagnosis Date    Alpha thalassemia     Asthma     CKD (chronic kidney disease) stage 3, GFR 30-59 ml/min     COPD (chronic obstructive pulmonary disease)     Diabetes mellitus     Diabetes mellitus type II     Hx-TIA (transient ischemic attack)     Hyperlipemia     Hypertension     Obesity     Sickle cell trait        Past Surgical History:   Procedure Laterality Date    CHOLECYSTECTOMY  2016 Sept.    LUNG SURGERY      chest tube placement d/t collapsed lung     SOCIAL HISTORY:  He is a former smoker.  He has a 15-pack-year history.  ETOH use recently.  No history of IV drug abuse.  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    SOCIAL HISTORY:  He is a former smoker.  He has a  "15-pack-year history.  ETOH   use recently.  No history of IV drug abuse.  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   HENT: Negative for congestion, hearing loss, mouth sores and nosebleeds.    Eyes: Negative for visual disturbance.   Respiratory: Negative for cough, chest tightness, shortness of breath and wheezing.    Cardiovascular: Negative for chest pain, palpitations and leg swelling.   Gastrointestinal: Negative for abdominal distention, abdominal pain, blood in stool, diarrhea, nausea and vomiting.   Genitourinary: Negative for dysuria, flank pain, frequency, hematuria and urgency.   Musculoskeletal: Negative for arthralgias, gait problem and joint swelling.   Skin: Negative for rash.        No ecchymoses, petechiae   Neurological: Negative for dizziness, tremors, seizures, syncope, weakness, light-headedness, numbness and headaches.   Hematological: Negative for adenopathy. Does not bruise/bleed easily.   Psychiatric/Behavioral: Negative for confusion. The patient is not nervous/anxious.        Objective:     ECOG 0  Vitals:    07/10/17 1352   BP: 114/62   BP Location: Right arm   Patient Position: Sitting   BP Method: Manual   Pulse: 69   Temp: 98.6 °F (37 °C)   TempSrc: Oral   SpO2: 96%   Weight: 95.4 kg (210 lb 5.1 oz)   Height: 5' 10" (1.778 m)       Physical Exam   Constitutional: He is oriented to person, place, and time. He appears well-developed and well-nourished.   HENT:   Head: Normocephalic.   Mouth/Throat: Oropharynx is clear and moist. No oropharyngeal exudate.   Eyes: No scleral icterus.   Neck: Normal range of motion. Neck supple. No thyromegaly present.   Cardiovascular: Normal rate, regular rhythm and normal heart sounds.    No murmur heard.  Pulmonary/Chest: Effort normal and breath sounds normal. He has no wheezes. He has no " rales.   Abdominal: Soft. Bowel sounds are normal. He exhibits no distension and no mass. There is no hepatosplenomegaly. There is no tenderness. There is no rebound and no guarding.   Musculoskeletal: Normal range of motion. He exhibits no edema.   Lymphadenopathy:     He has no cervical adenopathy.     He has no axillary adenopathy.        Right: No supraclavicular adenopathy present.        Left: No supraclavicular adenopathy present.   Neurological: He is alert and oriented to person, place, and time. No cranial nerve deficit.   Skin: No rash noted. No erythema.   Psychiatric: He has a normal mood and affect.           HIDA suggestive of acute cholecystitis    Cholangiogram  Multiple radiographs demonstrate contrast injected into the cystic duct and extending into the extrahepatic common bile duct as well as intrahepatic biliary ducts.  No filling defects are noted.  There is extension of the contrast into the duodenum    CT chest There is a 1.7 cm noncalcified left lung base pulmonary nodule concerning for metastatic disease.    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 Acute necrotizing  cholecystitis (gangrenous cholecystitis). See synoptic report.  Synoptic report-carcinoma of the gallbladder.  Specimen-gallbladder  Procedure-simple cholecystectomy  Tumor site-body and fundus  Tumor size-3.5 cm in greatest dimension  Histologic type- adenocarcinoma, papillary adenocarcinoma  Histologic grade-G2: Moderately differentiated  Microscopic tumor extension-tumor invades perimuscular connective tissue.  Margins-cannot be assessed. (A sections submitted as gallbladder neck margin consistent of a medium -sized  artery and surrounding inflamed fibrofatty tissue. No gallbladder mucosa and/or muscular wall is present for  evaluation). The specimen was reexamined grossly, however, the surgical margin was not able to be identified with  certainty.  Pathologic  staging-pT2.  Regional lymph nodes-pN0. One lymph node examined. 0 lymph nodes involved (1 of 1 lymph nodes are free of  tumor)  Additional pathologic findings-acute gangrenous cholecystitis.  Results for DELVIS ANAND (MRN [id number]) as of 7/10/2017 14:05   Ref. Range 1/30/2017 14:46 4/4/2017 14:35 6/30/2017 09:16   CEA Latest Ref Range: 0.0 - 5.0 ng/mL 3.4  3.2     CT a/p w/out contrast 6/30/2017  Prior cholecystectomy.    Stable 1.6 cm left lower lobe pulmonary nodule.    Stable 1.5 cm lucent lesion in the L4 vertebral body, likely a hemangioma. No new lytic or blastic lesions identified.    Prostatomegaly with evidence of chronic outlet obstruction.    Small bowel containing umbilical hernia.    Mild colonic diverticulosis.    Assessment:       1. Gallbladder cancer        Plan:   Pt clinically stable  67-year-old with adenocarcinoma of the gallbladder, pT2 pN0( 1 LN removed) s/p  open cholecystectomy  10/06/2016.    S/p  EUS/EGD 11/2016    FNA of liver - benign.   Pt declined/not interested in chemotherapy  Discussed radiographic findings in detail w/pt - KATLYN recurrence  Cont to monitor    F/u 2 mos with cbc,cmp prior to f/u     CC:Anahy Matthew MD

## 2017-07-20 RX ORDER — FOLIC ACID 1 MG/1
TABLET ORAL
Qty: 90 TABLET | Refills: 1 | Status: SHIPPED | OUTPATIENT
Start: 2017-07-20 | End: 2018-03-12 | Stop reason: SDUPTHER

## 2017-07-20 RX ORDER — AMLODIPINE BESYLATE 10 MG/1
TABLET ORAL
Qty: 90 TABLET | Refills: 0 | Status: SHIPPED | OUTPATIENT
Start: 2017-07-20 | End: 2017-11-01 | Stop reason: SDUPTHER

## 2017-07-20 RX ORDER — TAMSULOSIN HYDROCHLORIDE 0.4 MG/1
CAPSULE ORAL
Qty: 90 CAPSULE | Refills: 0 | Status: SHIPPED | OUTPATIENT
Start: 2017-07-20 | End: 2017-11-01 | Stop reason: SDUPTHER

## 2017-07-22 RX ORDER — TAMSULOSIN HYDROCHLORIDE 0.4 MG/1
CAPSULE ORAL
Qty: 90 CAPSULE | Refills: 0 | Status: SHIPPED | OUTPATIENT
Start: 2017-07-22 | End: 2017-09-12

## 2017-09-11 ENCOUNTER — LAB VISIT (OUTPATIENT)
Dept: LAB | Facility: HOSPITAL | Age: 67
End: 2017-09-11
Attending: INTERNAL MEDICINE
Payer: MEDICARE

## 2017-09-11 DIAGNOSIS — C23 MALIGNANT NEOPLASM OF GALLBLADDER: ICD-10-CM

## 2017-09-11 DIAGNOSIS — C23 GALLBLADDER CANCER: ICD-10-CM

## 2017-09-11 DIAGNOSIS — R91.1 PULMONARY NODULE: ICD-10-CM

## 2017-09-11 LAB
ALBUMIN SERPL BCP-MCNC: 3.9 G/DL
ALP SERPL-CCNC: 70 U/L
ALT SERPL W/O P-5'-P-CCNC: 9 U/L
ANION GAP SERPL CALC-SCNC: 10 MMOL/L
AST SERPL-CCNC: 14 U/L
BASOPHILS # BLD AUTO: 0.02 K/UL
BASOPHILS NFR BLD: 0.4 %
BILIRUB SERPL-MCNC: 1.3 MG/DL
BUN SERPL-MCNC: 14 MG/DL
CALCIUM SERPL-MCNC: 10 MG/DL
CEA SERPL-MCNC: 2.9 NG/ML
CHLORIDE SERPL-SCNC: 103 MMOL/L
CO2 SERPL-SCNC: 25 MMOL/L
CREAT SERPL-MCNC: 1.6 MG/DL
DIFFERENTIAL METHOD: ABNORMAL
EOSINOPHIL # BLD AUTO: 0.2 K/UL
EOSINOPHIL NFR BLD: 4.4 %
ERYTHROCYTE [DISTWIDTH] IN BLOOD BY AUTOMATED COUNT: 14.1 %
EST. GFR  (AFRICAN AMERICAN): 51 ML/MIN/1.73 M^2
EST. GFR  (NON AFRICAN AMERICAN): 44 ML/MIN/1.73 M^2
GLUCOSE SERPL-MCNC: 107 MG/DL
HCT VFR BLD AUTO: 39.6 %
HGB BLD-MCNC: 12.8 G/DL
LYMPHOCYTES # BLD AUTO: 1.6 K/UL
LYMPHOCYTES NFR BLD: 34.4 %
MCH RBC QN AUTO: 22.3 PG
MCHC RBC AUTO-ENTMCNC: 32.3 G/DL
MCV RBC AUTO: 69 FL
MONOCYTES # BLD AUTO: 0.5 K/UL
MONOCYTES NFR BLD: 9.5 %
NEUTROPHILS # BLD AUTO: 2.4 K/UL
NEUTROPHILS NFR BLD: 51.5 %
PLATELET # BLD AUTO: 247 K/UL
PMV BLD AUTO: 9.1 FL
POIKILOCYTOSIS BLD QL SMEAR: SLIGHT
POLYCHROMASIA BLD QL SMEAR: ABNORMAL
POTASSIUM SERPL-SCNC: 4.6 MMOL/L
PROT SERPL-MCNC: 7.7 G/DL
RBC # BLD AUTO: 5.75 M/UL
SODIUM SERPL-SCNC: 138 MMOL/L
WBC # BLD AUTO: 4.74 K/UL

## 2017-09-11 PROCEDURE — 82378 CARCINOEMBRYONIC ANTIGEN: CPT

## 2017-09-11 PROCEDURE — 85025 COMPLETE CBC W/AUTO DIFF WBC: CPT

## 2017-09-11 PROCEDURE — 80053 COMPREHEN METABOLIC PANEL: CPT

## 2017-09-11 PROCEDURE — 36415 COLL VENOUS BLD VENIPUNCTURE: CPT

## 2017-09-12 ENCOUNTER — OFFICE VISIT (OUTPATIENT)
Dept: HEMATOLOGY/ONCOLOGY | Facility: CLINIC | Age: 67
End: 2017-09-12
Payer: MEDICARE

## 2017-09-12 VITALS
HEIGHT: 70 IN | WEIGHT: 211.44 LBS | SYSTOLIC BLOOD PRESSURE: 112 MMHG | OXYGEN SATURATION: 95 % | HEART RATE: 73 BPM | DIASTOLIC BLOOD PRESSURE: 67 MMHG | BODY MASS INDEX: 30.27 KG/M2 | TEMPERATURE: 98 F

## 2017-09-12 DIAGNOSIS — C23 GALLBLADDER CANCER: Primary | ICD-10-CM

## 2017-09-12 PROCEDURE — 3008F BODY MASS INDEX DOCD: CPT | Mod: S$GLB,,, | Performed by: INTERNAL MEDICINE

## 2017-09-12 PROCEDURE — 1159F MED LIST DOCD IN RCRD: CPT | Mod: S$GLB,,, | Performed by: INTERNAL MEDICINE

## 2017-09-12 PROCEDURE — 3074F SYST BP LT 130 MM HG: CPT | Mod: S$GLB,,, | Performed by: INTERNAL MEDICINE

## 2017-09-12 PROCEDURE — 3078F DIAST BP <80 MM HG: CPT | Mod: S$GLB,,, | Performed by: INTERNAL MEDICINE

## 2017-09-12 PROCEDURE — 99999 PR PBB SHADOW E&M-EST. PATIENT-LVL III: CPT | Mod: PBBFAC,,, | Performed by: INTERNAL MEDICINE

## 2017-09-12 PROCEDURE — 1126F AMNT PAIN NOTED NONE PRSNT: CPT | Mod: S$GLB,,, | Performed by: INTERNAL MEDICINE

## 2017-09-12 PROCEDURE — 99213 OFFICE O/P EST LOW 20 MIN: CPT | Mod: S$GLB,,, | Performed by: INTERNAL MEDICINE

## 2017-09-12 NOTE — PROGRESS NOTES
"Subjective:       Patient ID: Evens Aguilar is a 67 y.o. male.    Chief Complaint: Follow-up    HPI   HISTORY OF PRESENT ILLNESS:  The patient is a 67-year-old gentleman seen today for followup for  gallbladder carcinoma. Her presented with   upper epigastric pain and shortness of breath.Labs reveal elevated LFT.  CT revealed no signs of obstruction.  HIDA scan consistent with acute cholecystitis.  The patient was treated with IV antibiotictherapy.  Surgery following the patient.  He underwent an open cholecystectomy.Pathology revealed adenocarcinoma T2NO( 1 LN resected). CT of the chest revealed a nodule concerning for met.Pt did not undergo planned CT guided bx of lung - review of lesion non-malignant  The patient had abnormal cholangiogram on surgery.  It was determined patient is not a candidate for aggressive surgical resection at this time. S/p EUS/EGD. Liver lesion. Benign.  Plan was for ERCP with spyglass to further evaluate duct. Pt declined.Pt also declined/not interested in undergoing chemo.      Today, he is doing well.  No new issues.  No abd pain/N/V  Appetite and weight remain stable  No SOB/CP  No fever    He reports, " I don't know why I still have to come here."         Past Medical History:   Diagnosis Date    Alpha thalassemia     Asthma     CKD (chronic kidney disease) stage 3, GFR 30-59 ml/min     COPD (chronic obstructive pulmonary disease)     Diabetes mellitus     Diabetes mellitus type II     Hx-TIA (transient ischemic attack)     Hyperlipemia     Hypertension     Obesity     Sickle cell trait        Past Surgical History:   Procedure Laterality Date    CHOLECYSTECTOMY  2016 Sept.    LUNG SURGERY      chest tube placement d/t collapsed lung     SOCIAL HISTORY:  He is a former smoker.  He has a 15-pack-year history.  ETOH use recently.  No history of IV drug abuse.  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    SOCIAL HISTORY:  He " "is a former smoker.  He has a 15-pack-year history.  ETOH   use recently.  No history of IV drug abuse.  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   HENT: Negative for congestion, hearing loss, mouth sores and nosebleeds.    Eyes: Negative for visual disturbance.   Respiratory: Negative for cough, chest tightness, shortness of breath and wheezing.    Cardiovascular: Negative for chest pain, palpitations and leg swelling.   Gastrointestinal: Negative for abdominal distention, abdominal pain, blood in stool, diarrhea, nausea and vomiting.   Genitourinary: Negative for dysuria, flank pain, frequency, hematuria and urgency.   Musculoskeletal: Negative for arthralgias, gait problem and joint swelling.   Skin: Negative for rash.        No ecchymoses, petechiae   Neurological: Negative for dizziness, tremors, seizures, syncope, weakness, light-headedness, numbness and headaches.   Hematological: Negative for adenopathy. Does not bruise/bleed easily.   Psychiatric/Behavioral: Negative for confusion. The patient is not nervous/anxious.        Objective:     ECOG 0  Vitals:    09/12/17 1527   BP: 112/67   BP Location: Left arm   Patient Position: Sitting   BP Method: Medium (Automatic)   Pulse: 73   Temp: 98.3 °F (36.8 °C)   TempSrc: Oral   SpO2: 95%   Weight: 95.9 kg (211 lb 6.7 oz)   Height: 5' 10" (1.778 m)       Physical Exam   Constitutional: He is oriented to person, place, and time. He appears well-developed and well-nourished.   HENT:   Head: Normocephalic.   Mouth/Throat: Oropharynx is clear and moist. No oropharyngeal exudate.   Eyes: No scleral icterus.   Neck: Normal range of motion. Neck supple. No thyromegaly present.   Cardiovascular: Normal rate, regular rhythm and normal heart sounds.    No murmur heard.  Pulmonary/Chest: Effort normal and breath sounds " normal. He has no wheezes. He has no rales.   Abdominal: Soft. Bowel sounds are normal. He exhibits no distension and no mass. There is no hepatosplenomegaly. There is no tenderness. There is no rebound and no guarding.   Musculoskeletal: Normal range of motion. He exhibits no edema.   Lymphadenopathy:     He has no cervical adenopathy.     He has no axillary adenopathy.        Right: No supraclavicular adenopathy present.        Left: No supraclavicular adenopathy present.   Neurological: He is alert and oriented to person, place, and time. No cranial nerve deficit.   Skin: No rash noted. No erythema.   Psychiatric: He has a normal mood and affect.           HIDA suggestive of acute cholecystitis    Cholangiogram  Multiple radiographs demonstrate contrast injected into the cystic duct and extending into the extrahepatic common bile duct as well as intrahepatic biliary ducts.  No filling defects are noted.  There is extension of the contrast into the duodenum    CT chest There is a 1.7 cm noncalcified left lung base pulmonary nodule concerning for metastatic disease.    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 Acute necrotizing  cholecystitis (gangrenous cholecystitis). See synoptic report.  Synoptic report-carcinoma of the gallbladder.  Specimen-gallbladder  Procedure-simple cholecystectomy  Tumor site-body and fundus  Tumor size-3.5 cm in greatest dimension  Histologic type- adenocarcinoma, papillary adenocarcinoma  Histologic grade-G2: Moderately differentiated  Microscopic tumor extension-tumor invades perimuscular connective tissue.  Margins-cannot be assessed. (A sections submitted as gallbladder neck margin consistent of a medium -sized  artery and surrounding inflamed fibrofatty tissue. No gallbladder mucosa and/or muscular wall is present for  evaluation). The specimen was reexamined grossly, however, the surgical margin was not able to be  identified with  certainty.  Pathologic staging-pT2.  Regional lymph nodes-pN0. One lymph node examined. 0 lymph nodes involved (1 of 1 lymph nodes are free of  tumor)  Additional pathologic findings-acute gangrenous cholecystitis.  Results for DELVIS ANAND (MRN [id number]) as of 7/10/2017 14:05   Ref. Range 1/30/2017 14:46 4/4/2017 14:35 6/30/2017 09:16   [facility] Latest Ref Range: 0.0 - 5.0 ng/mL 3.4  3.2     CT a/p w/out contrast 6/30/2017  Prior cholecystectomy.    Stable 1.6 cm left lower lobe pulmonary nodule.    Stable 1.5 cm lucent lesion in the L4 vertebral body, likely a hemangioma. No new lytic or blastic lesions identified.    Prostatomegaly with evidence of chronic outlet obstruction.    Small bowel containing umbilical hernia.    Mild colonic diverticulosis.    Results for DELVIS ANAND (MRN [id number]) as of 9/12/2017 15:37   Ref. Range 1/30/2017 14:46 4/4/2017 14:35 6/30/2017 09:16 6/30/2017 10:03 9/11/2017 10:50   [facility] Latest Ref Range: 0.0 - 5.0 ng/mL 3.4  3.2  2.9       Assessment:       1. Gallbladder cancer        Plan:   Pt clinically stable  67-year-old with adenocarcinoma of the gallbladder, pT2 pN0( 1 LN removed) s/p  open cholecystectomy  10/06/2016.    S/p  EUS/EGD 11/2016    FNA of liver - benign.   Pt declined/not interested in adjuvant chemotherapy  CT imaging 6/2017- KATLYN recurrence  Detailed d/w pt regarding recommended surveillance as per NCCN guidelines as pt is at risk of disease recurrence  He elects decreased frequency of follow-ups ( financial )     Follow-up 4 mos with [facility], [facility] , [facility] 19-9 prior to f/u       CC:Anahy Matthew MD

## 2017-10-20 RX ORDER — PRAVASTATIN SODIUM 20 MG/1
TABLET ORAL
Qty: 90 TABLET | Refills: 0 | Status: SHIPPED | OUTPATIENT
Start: 2017-10-20 | End: 2017-11-01 | Stop reason: SDUPTHER

## 2017-10-22 RX ORDER — PRAVASTATIN SODIUM 20 MG/1
TABLET ORAL
Qty: 90 TABLET | Refills: 0 | OUTPATIENT
Start: 2017-10-22

## 2017-11-01 ENCOUNTER — OFFICE VISIT (OUTPATIENT)
Dept: FAMILY MEDICINE | Facility: CLINIC | Age: 67
End: 2017-11-01
Payer: MEDICARE

## 2017-11-01 VITALS
BODY MASS INDEX: 29.35 KG/M2 | SYSTOLIC BLOOD PRESSURE: 110 MMHG | WEIGHT: 205 LBS | HEART RATE: 76 BPM | TEMPERATURE: 99 F | HEIGHT: 70 IN | DIASTOLIC BLOOD PRESSURE: 68 MMHG | OXYGEN SATURATION: 96 %

## 2017-11-01 DIAGNOSIS — Z12.5 PROSTATE CANCER SCREENING: ICD-10-CM

## 2017-11-01 DIAGNOSIS — I63.50 CEREBRAL ARTERY OCCLUSION WITH CEREBRAL INFARCTION: ICD-10-CM

## 2017-11-01 DIAGNOSIS — Z23 NEEDS FLU SHOT: ICD-10-CM

## 2017-11-01 DIAGNOSIS — E11.9 DIABETES MELLITUS WITHOUT COMPLICATION: Primary | ICD-10-CM

## 2017-11-01 DIAGNOSIS — I10 ESSENTIAL HYPERTENSION: ICD-10-CM

## 2017-11-01 PROCEDURE — 99214 OFFICE O/P EST MOD 30 MIN: CPT | Mod: S$GLB,,, | Performed by: FAMILY MEDICINE

## 2017-11-01 PROCEDURE — 99999 PR PBB SHADOW E&M-EST. PATIENT-LVL III: CPT | Mod: PBBFAC,,, | Performed by: FAMILY MEDICINE

## 2017-11-01 PROCEDURE — 90662 IIV NO PRSV INCREASED AG IM: CPT | Mod: S$GLB,,, | Performed by: FAMILY MEDICINE

## 2017-11-01 PROCEDURE — 99499 UNLISTED E&M SERVICE: CPT | Mod: S$GLB,,, | Performed by: FAMILY MEDICINE

## 2017-11-01 PROCEDURE — G0008 ADMIN INFLUENZA VIRUS VAC: HCPCS | Mod: S$GLB,,, | Performed by: FAMILY MEDICINE

## 2017-11-01 RX ORDER — PRAVASTATIN SODIUM 20 MG/1
20 TABLET ORAL DAILY
Qty: 90 TABLET | Refills: 1 | Status: SHIPPED | OUTPATIENT
Start: 2017-11-01 | End: 2018-03-26 | Stop reason: SDUPTHER

## 2017-11-01 RX ORDER — AMLODIPINE BESYLATE 10 MG/1
10 TABLET ORAL DAILY
Qty: 90 TABLET | Refills: 1 | Status: SHIPPED | OUTPATIENT
Start: 2017-11-01 | End: 2018-03-26 | Stop reason: SDUPTHER

## 2017-11-01 RX ORDER — GLIMEPIRIDE 1 MG/1
1 TABLET ORAL
Qty: 90 TABLET | Refills: 1 | Status: SHIPPED | OUTPATIENT
Start: 2017-11-01 | End: 2018-03-26 | Stop reason: SDUPTHER

## 2017-11-01 RX ORDER — ISOSORBIDE MONONITRATE 30 MG/1
TABLET, EXTENDED RELEASE ORAL
Qty: 30 TABLET | Refills: 5 | Status: SHIPPED | OUTPATIENT
Start: 2017-11-01 | End: 2018-03-26 | Stop reason: SDUPTHER

## 2017-11-01 RX ORDER — HYDROXYZINE PAMOATE 25 MG/1
25 CAPSULE ORAL NIGHTLY PRN
Qty: 30 CAPSULE | Refills: 1 | Status: SHIPPED | OUTPATIENT
Start: 2017-11-01 | End: 2018-06-21 | Stop reason: SDUPTHER

## 2017-11-01 RX ORDER — TAMSULOSIN HYDROCHLORIDE 0.4 MG/1
1 CAPSULE ORAL DAILY
Qty: 90 CAPSULE | Refills: 1 | Status: SHIPPED | OUTPATIENT
Start: 2017-11-01

## 2017-11-01 RX ORDER — LISINOPRIL 2.5 MG/1
2.5 TABLET ORAL DAILY
Qty: 90 TABLET | Refills: 1 | Status: SHIPPED | OUTPATIENT
Start: 2017-11-01 | End: 2018-03-26 | Stop reason: SDUPTHER

## 2017-11-01 NOTE — PROGRESS NOTES
"Subjective:       Patient ID: Evens Aguilar is a 67 y.o. male.    Chief Complaint: Diabetes Follow Up/ Hypertension    Hypertension   This is a chronic problem. The current episode started more than 1 year ago. The problem is unchanged. The problem is controlled. Pertinent negatives include no chest pain, headaches, palpitations, peripheral edema or shortness of breath. Risk factors for coronary artery disease include male gender, obesity, dyslipidemia and diabetes mellitus. Past treatments include calcium channel blockers and ACE inhibitors. The current treatment provides significant improvement. There are no compliance problems.    Diabetes   He presents for his follow-up diabetic visit. He has type 2 diabetes mellitus. His disease course has been stable. Pertinent negatives for hypoglycemia include no headaches. Pertinent negatives for diabetes include no chest pain, no foot paresthesias, no polydipsia, no polyphagia and no polyuria. Risk factors for coronary artery disease include male sex, hypertension, dyslipidemia and diabetes mellitus. He is compliant with treatment all of the time. His weight is stable. His breakfast blood glucose range is generally  mg/dl. An ACE inhibitor/angiotensin II receptor blocker is being taken. Eye exam is current ([facility]).     Review of Systems   Respiratory: Negative for shortness of breath.    Cardiovascular: Negative for chest pain and palpitations.   Endocrine: Negative for polydipsia, polyphagia and polyuria.   Neurological: Negative for headaches.       Objective:       Vitals:    11/01/17 0950   BP: 110/68   Pulse: 76   Temp: 99.4 °F (37.4 °C)   TempSrc: Oral   SpO2: 96%   Weight: 93 kg (205 lb 0.4 oz)   Height: 5' 10" (1.778 m)       Physical Exam   Constitutional: He is oriented to person, place, and time. He appears well-developed and well-nourished. No distress.   HENT:   Head: Normocephalic and atraumatic.   Eyes: Conjunctivae are normal.   Neck: " Normal range of motion. Neck supple. Carotid bruit is not present.   Cardiovascular: Normal rate, regular rhythm and normal heart sounds.  Exam reveals no gallop and no friction rub.    No murmur heard.  Pulmonary/Chest: Effort normal and breath sounds normal. No respiratory distress. He has no wheezes. He has no rales.   Musculoskeletal: He exhibits no edema.   Lymphadenopathy:     He has no cervical adenopathy.   Neurological: He is alert and oriented to person, place, and time.   Skin: He is not diaphoretic.   Psychiatric: He has a normal mood and affect.       Assessment:       1. Diabetes mellitus without complication    2. Prostate cancer screening    3. Cerebral artery occlusion with cerebral infarction    4. Essential hypertension        Plan:       Evens was seen today for diabetes follow up/ hypertension.    Diagnoses and all orders for this visit:    Diabetes mellitus without complication  -     Comprehensive metabolic panel; Future  -     Microalbumin/creatinine urine ratio; Future  -     Hemoglobin A1c; Future  -     TSH; Future  -     Lipid panel; Future  -     CBC auto differential; Future  -     glimepiride (AMARYL) 1 MG tablet; Take 1 tablet (1 mg total) by mouth before breakfast.    Prostate cancer screening  -     PSA, Screening; Future    Cerebral artery occlusion with cerebral infarction  -     pravastatin (PRAVACHOL) 20 MG tablet; Take 1 tablet (20 mg total) by mouth once daily.  [place]. Will eep cholesterol controlled.   Essential hypertension  -     Comprehensive metabolic panel; Future  -     TSH; Future  -     Lipid panel; Future  -     CBC auto differential; Future  -     amLODIPine (NORVASC) 10 MG tablet; Take 1 tablet (10 mg total) by mouth once daily.  -     lisinopril (PRINIVIL,ZESTRIL) 2.5 MG tablet; Take 1 tablet (2.5 mg total) by mouth once daily.  -     isosorbide mononitrate (IMDUR) 30 MG 24 hr tablet; TAKE 1 TABLET(S) BY MOUTH daily  Stable. Refilled meds.     Other orders    -      hydrOXYzine pamoate (VISTARIL) 25 MG Cap; Take 1 capsule (25 mg total) by mouth nightly as needed.  -     tamsulosin (FLOMAX) 0.4 mg Cp24; Take 1 capsule (0.4 mg total) by mouth once daily.  -     Influenza - High Dose (65+) (PF) (IM)

## 2017-11-03 ENCOUNTER — LAB VISIT (OUTPATIENT)
Dept: LAB | Facility: HOSPITAL | Age: 67
End: 2017-11-03
Attending: FAMILY MEDICINE
Payer: MEDICARE

## 2017-11-03 DIAGNOSIS — I10 ESSENTIAL HYPERTENSION: ICD-10-CM

## 2017-11-03 DIAGNOSIS — E11.9 DIABETES MELLITUS WITHOUT COMPLICATION: ICD-10-CM

## 2017-11-03 DIAGNOSIS — Z12.5 PROSTATE CANCER SCREENING: ICD-10-CM

## 2017-11-03 LAB
ALBUMIN SERPL BCP-MCNC: 3.9 G/DL
ALP SERPL-CCNC: 77 U/L
ALT SERPL W/O P-5'-P-CCNC: 29 U/L
ANION GAP SERPL CALC-SCNC: 10 MMOL/L
AST SERPL-CCNC: 46 U/L
BASOPHILS # BLD AUTO: 0.02 K/UL
BASOPHILS NFR BLD: 0.4 %
BILIRUB SERPL-MCNC: 1.5 MG/DL
BUN SERPL-MCNC: 9 MG/DL
CALCIUM SERPL-MCNC: 10.1 MG/DL
CHLORIDE SERPL-SCNC: 103 MMOL/L
CHOLEST SERPL-MCNC: 203 MG/DL
CHOLEST/HDLC SERPL: 2.6 {RATIO}
CO2 SERPL-SCNC: 28 MMOL/L
COMPLEXED PSA SERPL-MCNC: 1.1 NG/ML
CREAT SERPL-MCNC: 1.4 MG/DL
CREAT UR-MCNC: 75 MG/DL
DIFFERENTIAL METHOD: ABNORMAL
EOSINOPHIL # BLD AUTO: 0.2 K/UL
EOSINOPHIL NFR BLD: 3.5 %
ERYTHROCYTE [DISTWIDTH] IN BLOOD BY AUTOMATED COUNT: 14.7 %
EST. GFR  (AFRICAN AMERICAN): 60 ML/MIN/1.73 M^2
EST. GFR  (NON AFRICAN AMERICAN): 52 ML/MIN/1.73 M^2
ESTIMATED AVG GLUCOSE: 117 MG/DL
GLUCOSE SERPL-MCNC: 114 MG/DL
HBA1C MFR BLD HPLC: 5.7 %
HCT VFR BLD AUTO: 39.1 %
HDLC SERPL-MCNC: 78 MG/DL
HDLC SERPL: 38.4 %
HGB BLD-MCNC: 12.8 G/DL
HYPOCHROMIA BLD QL SMEAR: ABNORMAL
LDLC SERPL CALC-MCNC: 107.6 MG/DL
LYMPHOCYTES # BLD AUTO: 1.4 K/UL
LYMPHOCYTES NFR BLD: 29.2 %
MCH RBC QN AUTO: 22.7 PG
MCHC RBC AUTO-ENTMCNC: 32.7 G/DL
MCV RBC AUTO: 69 FL
MICROALBUMIN UR DL<=1MG/L-MCNC: 6 UG/ML
MICROALBUMIN/CREATININE RATIO: 8 UG/MG
MONOCYTES # BLD AUTO: 0.6 K/UL
MONOCYTES NFR BLD: 12.7 %
NEUTROPHILS # BLD AUTO: 2.7 K/UL
NEUTROPHILS NFR BLD: 54.6 %
NONHDLC SERPL-MCNC: 125 MG/DL
PLATELET # BLD AUTO: 233 K/UL
PMV BLD AUTO: 9.6 FL
POTASSIUM SERPL-SCNC: 4.2 MMOL/L
PROT SERPL-MCNC: 7.7 G/DL
RBC # BLD AUTO: 5.64 M/UL
SODIUM SERPL-SCNC: 141 MMOL/L
TRIGL SERPL-MCNC: 87 MG/DL
TSH SERPL DL<=0.005 MIU/L-ACNC: 1.8 UIU/ML
WBC # BLD AUTO: 4.9 K/UL

## 2017-11-03 PROCEDURE — 84153 ASSAY OF PSA TOTAL: CPT

## 2017-11-03 PROCEDURE — 82570 ASSAY OF URINE CREATININE: CPT

## 2017-11-03 PROCEDURE — 85025 COMPLETE CBC W/AUTO DIFF WBC: CPT

## 2017-11-03 PROCEDURE — 83036 HEMOGLOBIN GLYCOSYLATED A1C: CPT

## 2017-11-03 PROCEDURE — 80053 COMPREHEN METABOLIC PANEL: CPT

## 2017-11-03 PROCEDURE — 80061 LIPID PANEL: CPT

## 2017-11-03 PROCEDURE — 84443 ASSAY THYROID STIM HORMONE: CPT

## 2017-11-03 PROCEDURE — 36415 COLL VENOUS BLD VENIPUNCTURE: CPT

## 2018-02-01 RX ORDER — IPRATROPIUM BROMIDE AND ALBUTEROL SULFATE 2.5; .5 MG/3ML; MG/3ML
3 SOLUTION RESPIRATORY (INHALATION) EVERY 6 HOURS PRN
Qty: 1 BOX | Refills: 1 | Status: SHIPPED | OUTPATIENT
Start: 2018-02-01 | End: 2018-03-23 | Stop reason: SDUPTHER

## 2018-02-01 NOTE — TELEPHONE ENCOUNTER
----- Message from Katty Torres sent at 2/1/2018  3:15 PM CST -----  Contact: DAUGHTER  REFILL: albuterol-ipratropium 2.5mg-0.5mg/3mL (DUO-NEB) 0.5 mg-3 mg(2.5 mg base)/3 mL nebulizer solution    PHARMACY: PLEASE SEND TO [facility]

## 2018-03-13 RX ORDER — FOLIC ACID 1 MG/1
TABLET ORAL
Qty: 90 TABLET | Refills: 1 | Status: SHIPPED | OUTPATIENT
Start: 2018-03-13 | End: 2018-07-13 | Stop reason: SDUPTHER

## 2018-03-24 RX ORDER — IPRATROPIUM BROMIDE AND ALBUTEROL SULFATE 2.5; .5 MG/3ML; MG/3ML
SOLUTION RESPIRATORY (INHALATION)
Qty: 90 VIAL | Refills: 1 | Status: SHIPPED | OUTPATIENT
Start: 2018-03-24 | End: 2018-07-13 | Stop reason: SDUPTHER

## 2018-03-26 ENCOUNTER — OFFICE VISIT (OUTPATIENT)
Dept: FAMILY MEDICINE | Facility: CLINIC | Age: 68
End: 2018-03-26
Payer: MEDICARE

## 2018-03-26 VITALS
DIASTOLIC BLOOD PRESSURE: 60 MMHG | TEMPERATURE: 98 F | SYSTOLIC BLOOD PRESSURE: 110 MMHG | HEIGHT: 70 IN | RESPIRATION RATE: 16 BRPM | BODY MASS INDEX: 28.29 KG/M2 | HEART RATE: 71 BPM | OXYGEN SATURATION: 95 % | WEIGHT: 197.63 LBS

## 2018-03-26 DIAGNOSIS — J44.9 CHRONIC OBSTRUCTIVE PULMONARY DISEASE, UNSPECIFIED COPD TYPE: ICD-10-CM

## 2018-03-26 DIAGNOSIS — D56.0 ALPHA THALASSEMIA MAJOR: ICD-10-CM

## 2018-03-26 DIAGNOSIS — E11.9 DIABETES MELLITUS WITHOUT COMPLICATION: ICD-10-CM

## 2018-03-26 DIAGNOSIS — I10 ESSENTIAL HYPERTENSION: ICD-10-CM

## 2018-03-26 DIAGNOSIS — E78.5 HYPERLIPIDEMIA, UNSPECIFIED HYPERLIPIDEMIA TYPE: ICD-10-CM

## 2018-03-26 DIAGNOSIS — J44.1 COPD EXACERBATION: Primary | ICD-10-CM

## 2018-03-26 DIAGNOSIS — D57.3 SICKLE-CELL TRAIT: ICD-10-CM

## 2018-03-26 PROCEDURE — 99499 UNLISTED E&M SERVICE: CPT | Mod: S$GLB,,, | Performed by: FAMILY MEDICINE

## 2018-03-26 PROCEDURE — 99999 PR PBB SHADOW E&M-EST. PATIENT-LVL III: CPT | Mod: PBBFAC,,, | Performed by: FAMILY MEDICINE

## 2018-03-26 PROCEDURE — 94640 AIRWAY INHALATION TREATMENT: CPT | Mod: 59,S$GLB,, | Performed by: FAMILY MEDICINE

## 2018-03-26 PROCEDURE — 3044F HG A1C LEVEL LT 7.0%: CPT | Mod: CPTII,S$GLB,, | Performed by: FAMILY MEDICINE

## 2018-03-26 PROCEDURE — 3078F DIAST BP <80 MM HG: CPT | Mod: CPTII,S$GLB,, | Performed by: FAMILY MEDICINE

## 2018-03-26 PROCEDURE — 3074F SYST BP LT 130 MM HG: CPT | Mod: CPTII,S$GLB,, | Performed by: FAMILY MEDICINE

## 2018-03-26 PROCEDURE — 96372 THER/PROPH/DIAG INJ SC/IM: CPT | Mod: S$GLB,,, | Performed by: FAMILY MEDICINE

## 2018-03-26 PROCEDURE — 99214 OFFICE O/P EST MOD 30 MIN: CPT | Mod: 25,S$GLB,, | Performed by: FAMILY MEDICINE

## 2018-03-26 RX ORDER — METHYLPREDNISOLONE 4 MG/1
TABLET ORAL
Qty: 1 PACKAGE | Refills: 0 | Status: SHIPPED | OUTPATIENT
Start: 2018-03-26 | End: 2018-04-16

## 2018-03-26 RX ORDER — PRAVASTATIN SODIUM 20 MG/1
20 TABLET ORAL DAILY
Qty: 90 TABLET | Refills: 1 | Status: SHIPPED | OUTPATIENT
Start: 2018-03-26

## 2018-03-26 RX ORDER — IPRATROPIUM BROMIDE AND ALBUTEROL SULFATE 2.5; .5 MG/3ML; MG/3ML
3 SOLUTION RESPIRATORY (INHALATION)
Status: COMPLETED | OUTPATIENT
Start: 2018-03-26 | End: 2018-03-26

## 2018-03-26 RX ORDER — ISOSORBIDE MONONITRATE 30 MG/1
TABLET, EXTENDED RELEASE ORAL
Qty: 30 TABLET | Refills: 5 | Status: SHIPPED | OUTPATIENT
Start: 2018-03-26 | End: 2018-08-02

## 2018-03-26 RX ORDER — GLIMEPIRIDE 1 MG/1
1 TABLET ORAL
Qty: 90 TABLET | Refills: 1 | Status: ON HOLD | OUTPATIENT
Start: 2018-03-26 | End: 2018-07-28 | Stop reason: HOSPADM

## 2018-03-26 RX ORDER — BUDESONIDE AND FORMOTEROL FUMARATE DIHYDRATE 160; 4.5 UG/1; UG/1
AEROSOL RESPIRATORY (INHALATION)
Qty: 10.2 G | Refills: 11 | Status: SHIPPED | OUTPATIENT
Start: 2018-03-26 | End: 2018-07-13 | Stop reason: SDUPTHER

## 2018-03-26 RX ORDER — LISINOPRIL 2.5 MG/1
2.5 TABLET ORAL DAILY
Qty: 90 TABLET | Refills: 1 | Status: SHIPPED | OUTPATIENT
Start: 2018-03-26 | End: 2018-08-02

## 2018-03-26 RX ORDER — METHYLPREDNISOLONE ACETATE 80 MG/ML
80 INJECTION, SUSPENSION INTRA-ARTICULAR; INTRALESIONAL; INTRAMUSCULAR; SOFT TISSUE
Status: COMPLETED | OUTPATIENT
Start: 2018-03-26 | End: 2018-03-26

## 2018-03-26 RX ORDER — AMLODIPINE BESYLATE 10 MG/1
10 TABLET ORAL DAILY
Qty: 90 TABLET | Refills: 1 | Status: SHIPPED | OUTPATIENT
Start: 2018-03-26 | End: 2018-08-02 | Stop reason: SDUPTHER

## 2018-03-26 RX ADMIN — METHYLPREDNISOLONE ACETATE 80 MG: 80 INJECTION, SUSPENSION INTRA-ARTICULAR; INTRALESIONAL; INTRAMUSCULAR; SOFT TISSUE at 09:03

## 2018-03-26 RX ADMIN — IPRATROPIUM BROMIDE AND ALBUTEROL SULFATE 3 ML: 2.5; .5 SOLUTION RESPIRATORY (INHALATION) at 09:03

## 2018-03-26 NOTE — PROGRESS NOTES
"Subjective:       Patient ID: Evens Aguilar is a 68 y.o. male.    Chief Complaint: No chief complaint on file.    Shortness of Breath   This is a new problem. The current episode started 1 to 4 weeks ago (2 weeks). The problem occurs constantly. The problem has been unchanged. Associated symptoms include wheezing. Pertinent negatives include no chest pain, coryza, fever, orthopnea, rhinorrhea, sore throat or syncope. Exacerbated by: off of symbicort x 2 months. He has tried beta agonist inhalers for the symptoms.   Hypertension   This is a chronic problem. The current episode started more than 1 year ago. The problem is unchanged. The problem is controlled. Associated symptoms include shortness of breath. Pertinent negatives include no anxiety, chest pain, orthopnea, palpitations or peripheral edema. Past treatments include calcium channel blockers, ACE inhibitors and direct vasodilators. The current treatment provides significant improvement. There are no compliance problems.  There is no history of kidney disease.     Review of Systems   Constitutional: Negative for fever.   HENT: Negative for rhinorrhea and sore throat.    Respiratory: Positive for shortness of breath and wheezing.    Cardiovascular: Negative for chest pain, palpitations, orthopnea and syncope.       Objective:       Vitals:    03/26/18 0904   BP: 110/60   Pulse: 71   Resp: 16   Temp: 98.2 °F (36.8 °C)   TempSrc: Oral   SpO2: 95%   Weight: 89.6 kg (197 lb 9.6 oz)   Height: 5' 10" (1.778 m)       Physical Exam   Constitutional: He is oriented to person, place, and time. He appears well-developed and well-nourished. No distress.   HENT:   Head: Normocephalic and atraumatic.   Neck: Normal range of motion. Neck supple.   Cardiovascular: Normal rate, regular rhythm and normal heart sounds.  Exam reveals no gallop and no friction rub.    No murmur heard.  Pulmonary/Chest: He is in respiratory distress. He has wheezes. He has no rales. "   Neurological: He is alert and oriented to person, place, and time.   Skin: He is not diaphoretic.         Protective Sensation (w/ 10 gram monofilament):  Right: Intact  Left: Intact    Visual Inspection:  Callus -  Bilateral, Dry Skin -  Bilateral and Onychomycosis -  Bilateral    Pedal Pulses:   Right: Present  Left: Present    Posterior tibialis:   Right:Present  Left: Present      Assessment:       1. COPD exacerbation    2. Chronic obstructive pulmonary disease, unspecified COPD type    3. Diabetes mellitus without complication    4. Sickle-cell trait    5. Alpha thalassemia major    6. Essential hypertension    7. Hyperlipidemia, unspecified hyperlipidemia type        Plan:       Diagnoses and all orders for this visit:    COPD exacerbation  -     methylPREDNISolone acetate injection 80 mg; Inject 1 mL (80 mg total) into the muscle one time.  -     albuterol-ipratropium 2.5mg-0.5mg/3mL nebulizer solution 3 mL; Take 3 mLs by nebulization one time.  -     methylPREDNISolone (MEDROL DOSEPACK) 4 mg tablet; use as directed  Given breathing treatment and steroid shot. Given medrol pack and restarting symbicort. Advised that he cannot be off o  This. They switched to the preferred pharmacy and it is less expensice for him at $35/month.  Follow-up if symptoms worsen or fail to improve, for 2 months for labs.    Chronic obstructive pulmonary disease, unspecified COPD type  -     budesonide-formoterol 160-4.5 mcg (SYMBICORT) 160-4.5 mcg/actuation HFAA; Inhale 2 puffs into the lungs every 12 (twelve) hours. GENERIC EQUIVALENT IS OKAY    Diabetes mellitus without complication  -     glimepiride (AMARYL) 1 MG tablet; Take 1 tablet (1 mg total) by mouth before breakfast.  -     lisinopril (PRINIVIL,ZESTRIL) 2.5 MG tablet; Take 1 tablet (2.5 mg total) by mouth once daily.    Sickle-cell trait    Alpha thalassemia major    Essential hypertension  -     amLODIPine (NORVASC) 10 MG tablet; Take 1 tablet (10 mg total) by mouth  once daily.  -     lisinopril (PRINIVIL,ZESTRIL) 2.5 MG tablet; Take 1 tablet (2.5 mg total) by mouth once daily.  Stable. Refilled meds.     Hyperlipidemia, unspecified hyperlipidemia type  -     pravastatin (PRAVACHOL) 20 MG tablet; Take 1 tablet (20 mg total) by mouth once daily.  Stable. Refilled meds.   Follow-up if symptoms worsen or fail to improve, for 2 months for labs.    Other orders  -     isosorbide mononitrate (IMDUR) 30 MG 24 hr tablet; TAKE 1 TABLET(S) BY MOUTH daily

## 2018-03-26 NOTE — PROGRESS NOTES
Administered Depo-Medrol 80 mg IM to right ventrogluteal.  Patient tolerated injection well, no adverse reactions noted.   Administered albuterol-ipratropium 2.5mg-0.5mg orally via nebulizer.  Patient tolerated treatment  well, no adverse reactions noted.  Treatment effective.

## 2018-05-21 DIAGNOSIS — E11.9 TYPE 2 DIABETES MELLITUS WITHOUT COMPLICATION: ICD-10-CM

## 2018-06-21 RX ORDER — FOLIC ACID 1 MG/1
TABLET ORAL
Qty: 90 TABLET | Refills: 1 | Status: SHIPPED | OUTPATIENT
Start: 2018-06-21

## 2018-06-21 RX ORDER — HYDROXYZINE PAMOATE 25 MG/1
CAPSULE ORAL
Qty: 30 CAPSULE | Refills: 1 | Status: SHIPPED | OUTPATIENT
Start: 2018-06-21

## 2018-07-13 ENCOUNTER — HOSPITAL ENCOUNTER (OUTPATIENT)
Dept: RADIOLOGY | Facility: HOSPITAL | Age: 68
Discharge: HOME | End: 2018-07-13
Attending: FAMILY MEDICINE
Payer: MEDICARE

## 2018-07-13 ENCOUNTER — OFFICE VISIT (OUTPATIENT)
Dept: FAMILY MEDICINE | Facility: CLINIC | Age: 68
End: 2018-07-13
Payer: MEDICARE

## 2018-07-13 VITALS
OXYGEN SATURATION: 95 % | DIASTOLIC BLOOD PRESSURE: 64 MMHG | TEMPERATURE: 99 F | SYSTOLIC BLOOD PRESSURE: 100 MMHG | BODY MASS INDEX: 26.51 KG/M2 | HEART RATE: 73 BPM | WEIGHT: 185.19 LBS | HEIGHT: 70 IN

## 2018-07-13 DIAGNOSIS — J44.9 CHRONIC OBSTRUCTIVE PULMONARY DISEASE, UNSPECIFIED COPD TYPE: ICD-10-CM

## 2018-07-13 DIAGNOSIS — E11.22 TYPE 2 DIABETES MELLITUS WITH CHRONIC KIDNEY DISEASE, WITHOUT LONG-TERM CURRENT USE OF INSULIN, UNSPECIFIED CKD STAGE: Primary | ICD-10-CM

## 2018-07-13 DIAGNOSIS — R10.84 GENERALIZED ABDOMINAL PAIN: ICD-10-CM

## 2018-07-13 DIAGNOSIS — C23 MALIGNANT NEOPLASM OF GALLBLADDER: ICD-10-CM

## 2018-07-13 DIAGNOSIS — F10.20 ALCOHOLISM: ICD-10-CM

## 2018-07-13 DIAGNOSIS — R63.4 WEIGHT LOSS: ICD-10-CM

## 2018-07-13 PROCEDURE — 3078F DIAST BP <80 MM HG: CPT | Mod: CPTII,S$GLB,, | Performed by: FAMILY MEDICINE

## 2018-07-13 PROCEDURE — 99499 UNLISTED E&M SERVICE: CPT | Mod: S$GLB,,, | Performed by: FAMILY MEDICINE

## 2018-07-13 PROCEDURE — 99999 PR PBB SHADOW E&M-EST. PATIENT-LVL IV: CPT | Mod: PBBFAC,,, | Performed by: FAMILY MEDICINE

## 2018-07-13 PROCEDURE — 3074F SYST BP LT 130 MM HG: CPT | Mod: CPTII,S$GLB,, | Performed by: FAMILY MEDICINE

## 2018-07-13 PROCEDURE — 99214 OFFICE O/P EST MOD 30 MIN: CPT | Mod: S$GLB,,, | Performed by: FAMILY MEDICINE

## 2018-07-13 PROCEDURE — 74177 CT ABD & PELVIS W/CONTRAST: CPT | Mod: TC

## 2018-07-13 PROCEDURE — 3044F HG A1C LEVEL LT 7.0%: CPT | Mod: CPTII,S$GLB,, | Performed by: FAMILY MEDICINE

## 2018-07-13 PROCEDURE — 74177 CT ABD & PELVIS W/CONTRAST: CPT | Mod: 26,,, | Performed by: RADIOLOGY

## 2018-07-13 PROCEDURE — 25500020 PHARM REV CODE 255: Performed by: FAMILY MEDICINE

## 2018-07-13 RX ORDER — IPRATROPIUM BROMIDE AND ALBUTEROL SULFATE 2.5; .5 MG/3ML; MG/3ML
SOLUTION RESPIRATORY (INHALATION)
Qty: 90 VIAL | Refills: 1 | Status: SHIPPED | OUTPATIENT
Start: 2018-07-13

## 2018-07-13 RX ORDER — BUDESONIDE AND FORMOTEROL FUMARATE DIHYDRATE 160; 4.5 UG/1; UG/1
AEROSOL RESPIRATORY (INHALATION)
Qty: 10.2 G | Refills: 11 | Status: SHIPPED | OUTPATIENT
Start: 2018-07-13

## 2018-07-13 RX ADMIN — IOHEXOL 85 ML: 350 INJECTION, SOLUTION INTRAVENOUS at 03:07

## 2018-07-13 RX ADMIN — IOHEXOL 15 ML: 300 INJECTION, SOLUTION INTRAVENOUS at 03:07

## 2018-07-13 NOTE — PROGRESS NOTES
Subjective:       Patient ID: Evens Aguilar is a 68 y.o. male.    Chief Complaint: Discuss abnormal weight loss and Right Side/ Abdominal Pain    68 year-old patient presents with concerns about weight loss. He states he was drinking 2-3 beers a day and now he drinks 1 beer once a month so he doesn't understand. He states he has decreased his appetite. He states he is having pain on the right side of his abdomen. He has no nausea or vomiting. He has had pain there for the last 2 weeks. He states the pain has stopped now. He has lost 12 pounds in the last 4 months. He has lost 8 pounds 4 months prior to this.     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 Acute necrotizing  cholecystitis (gangrenous cholecystitis).    COLONOSCOPY IN 2014: Showed diverticulosis      Past Medical History:  No date: Alpha thalassemia  No date: Asthma  No date: CKD (chronic kidney disease) stage 3, GFR 30-5*  No date: COPD (chronic obstructive pulmonary disease)  No date: Diabetes mellitus  No date: Diabetes mellitus type II  No date: Hx-TIA (transient ischemic attack)  No date: Hyperlipemia  No date: Hypertension  No date: Obesity  No date: Sickle cell trait   Past Surgical History:  2016 Sept.: CHOLECYSTECTOMY  No date: LUNG SURGERY      Comment: chest tube placement d/T collapsed lung  Review of patient's family history indicates:  Problem: Hypertension      Relation: Mother       Age of Onset: (Not Specified)   Problem: Stroke      Relation: Father       Age of Onset: (Not Specified)   Problem: Hypertension      Relation: Sister       Age of Onset: (Not Specified)   Problem: Diabetes      Relation: Sister       Age of Onset: (Not Specified)   Problem: Amblyopia      Relation: Neg Hx       Age of Onset: (Not Specified)   Problem: Blindness      Relation: Neg Hx       Age of Onset: (Not Specified)   Problem:  "Cataracts      Relation: Neg Hx       Age of Onset: (Not Specified)   Problem: Glaucoma      Relation: Neg Hx       Age of Onset: (Not Specified)   Problem: Macular degeneration      Relation: Neg Hx       Age of Onset: (Not Specified)   Problem: Retinal detachment      Relation: Neg Hx       Age of Onset: (Not Specified)   Problem: Strabismus      Relation: Neg Hx       Age of Onset: (Not Specified)     Social History    Marital status:              Spouse name:                       Years of education:                 Number of children:               Occupational History    None on file    Social History Main Topics    Smoking status: Former Smoker                                                                Packs/day: 0.50      Years: 32.00          Quit date: 12/3/1999    Smokeless tobacco: Never Used                        Alcohol use: Yes           4.2 oz/week       Cans of beer: 7 per week       Comment: occassionally (none for 1 month)    Drug use: No              Sexual activity: Not on file          Other Topics            Concern    None on file    Social History Narrative    None on file            Review of Systems    Objective:       Vitals:    07/13/18 1042   BP: 100/64   Pulse: 73   Temp: 98.9 °F (37.2 °C)   TempSrc: Oral   SpO2: 95%   Weight: 84 kg (185 lb 3 oz)   Height: 5' 10" (1.778 m)     Physical Exam   Constitutional: He is oriented to person, place, and time. He appears well-developed and well-nourished. No distress.   HENT:   Head: Normocephalic and atraumatic.   Cardiovascular: Normal rate, regular rhythm and normal heart sounds.  Exam reveals no gallop and no friction rub.    No murmur heard.  Pulmonary/Chest: Effort normal and breath sounds normal. No respiratory distress. He has no wheezes. He has no rales.   Abdominal: Soft. Bowel sounds are normal. He exhibits mass. He exhibits no distension. There is no tenderness. There is no guarding.   Neurological: He is alert and " oriented to person, place, and time.   Skin: He is not diaphoretic.       Assessment:       1. Type 2 diabetes mellitus with chronic kidney disease, without long-term current use of insulin, unspecified CKD stage    2. Chronic obstructive pulmonary disease, unspecified COPD type    3. Alcoholism    4. Malignant neoplasm of gallbladder    5. Weight loss        Plan:       Evens was seen today for discuss abnormal weight loss and right side/ abdominal pain.    Diagnoses and all orders for this visit:    Type 2 diabetes mellitus with chronic kidney disease, without long-term current use of insulin, unspecified CKD stage  -     CBC auto differential; Future  -     Comprehensive metabolic panel; Future  -     Hemoglobin A1c; Future  -     Lipid panel; Future  Due for labs    Chronic obstructive pulmonary disease, unspecified COPD type  -     budesonide-formoterol 160-4.5 mcg (SYMBICORT) 160-4.5 mcg/actuation HFAA; Inhale 2 puffs into the lungs every 12 (twelve) hours. GENERIC EQUIVALENT IS OKAY    Alcoholism  -     Lipase; Future    Malignant neoplasm of gallbladder    Weight loss  -     Lipase; Future  -     TSH; Future  -     T4, free; Future    Generalized abdominal pain  -     Cancel: CT Abdomen Pelvis W Wo Contrast; Future  -     Creatinine, serum; Future  -     CT Abdomen Pelvis With Contrast; Future  PATIENT HAS A VERY LARGE MASS IN HIS ABDOMEN. WILL CHECK FOR SIGNS OF CANCER.     Other orders  -     albuterol-ipratropium (DUO-NEB) 2.5 mg-0.5 mg/3 mL nebulizer solution; USE ONE AMPULE IN NEBULIZER EVERY 6 HOURS AS NEEDED FOR WHEEZING

## 2018-07-14 ENCOUNTER — TELEPHONE (OUTPATIENT)
Dept: FAMILY MEDICINE | Facility: CLINIC | Age: 68
End: 2018-07-14

## 2018-07-16 ENCOUNTER — TELEPHONE (OUTPATIENT)
Dept: FAMILY MEDICINE | Facility: CLINIC | Age: 68
End: 2018-07-16

## 2018-07-16 NOTE — TELEPHONE ENCOUNTER
Patient informed of test results. Declines chemotherapy as he did int [facility]. Advised that this is stage 4 cancer

## 2018-07-17 ENCOUNTER — TELEPHONE (OUTPATIENT)
Dept: FAMILY MEDICINE | Facility: CLINIC | Age: 68
End: 2018-07-17

## 2018-07-17 NOTE — TELEPHONE ENCOUNTER
Spoke with sharlene. And she states Mr. Aguilar doesn't desire chemo or radiation. She has made a follow up with Dr. Rodriguez. Advised that this will likely be a hospice consult if not treatment is sought as it is stage 4 cancer at this point.

## 2018-07-20 ENCOUNTER — LAB VISIT (OUTPATIENT)
Dept: LAB | Facility: HOSPITAL | Age: 68
End: 2018-07-20
Attending: INTERNAL MEDICINE
Payer: MEDICARE

## 2018-07-20 DIAGNOSIS — C23 GALLBLADDER CANCER: ICD-10-CM

## 2018-07-20 LAB
ALBUMIN SERPL BCP-MCNC: 3.2 G/DL
ALP SERPL-CCNC: 240 U/L
ALT SERPL W/O P-5'-P-CCNC: 15 U/L
ANION GAP SERPL CALC-SCNC: 14 MMOL/L
ANISOCYTOSIS BLD QL SMEAR: SLIGHT
AST SERPL-CCNC: 39 U/L
BASOPHILS # BLD AUTO: 0.02 K/UL
BASOPHILS NFR BLD: 0.3 %
BILIRUB SERPL-MCNC: 1.4 MG/DL
BUN SERPL-MCNC: 17 MG/DL
CALCIUM SERPL-MCNC: 10 MG/DL
CEA SERPL-MCNC: 38.1 NG/ML
CHLORIDE SERPL-SCNC: 100 MMOL/L
CO2 SERPL-SCNC: 22 MMOL/L
CREAT SERPL-MCNC: 1.7 MG/DL
DIFFERENTIAL METHOD: ABNORMAL
EOSINOPHIL # BLD AUTO: 0 K/UL
EOSINOPHIL NFR BLD: 0.3 %
ERYTHROCYTE [DISTWIDTH] IN BLOOD BY AUTOMATED COUNT: 14.9 %
EST. GFR  (AFRICAN AMERICAN): 47 ML/MIN/1.73 M^2
EST. GFR  (NON AFRICAN AMERICAN): 41 ML/MIN/1.73 M^2
GLUCOSE SERPL-MCNC: 119 MG/DL
HCT VFR BLD AUTO: 29.5 %
HGB BLD-MCNC: 9.5 G/DL
LYMPHOCYTES # BLD AUTO: 0.9 K/UL
LYMPHOCYTES NFR BLD: 11.4 %
MCH RBC QN AUTO: 20.6 PG
MCHC RBC AUTO-ENTMCNC: 32.2 G/DL
MCV RBC AUTO: 64 FL
MONOCYTES # BLD AUTO: 0.8 K/UL
MONOCYTES NFR BLD: 9.8 %
NEUTROPHILS # BLD AUTO: 6.3 K/UL
NEUTROPHILS NFR BLD: 78.2 %
PLATELET # BLD AUTO: 465 K/UL
PMV BLD AUTO: 10.1 FL
POTASSIUM SERPL-SCNC: 4.2 MMOL/L
PROT SERPL-MCNC: 8.1 G/DL
RBC # BLD AUTO: 4.62 M/UL
SODIUM SERPL-SCNC: 136 MMOL/L
WBC # BLD AUTO: 8 K/UL

## 2018-07-20 PROCEDURE — 86301 IMMUNOASSAY TUMOR CA 19-9: CPT

## 2018-07-20 PROCEDURE — 36415 COLL VENOUS BLD VENIPUNCTURE: CPT

## 2018-07-20 PROCEDURE — 85025 COMPLETE CBC W/AUTO DIFF WBC: CPT

## 2018-07-20 PROCEDURE — 82378 CARCINOEMBRYONIC ANTIGEN: CPT

## 2018-07-20 PROCEDURE — 80053 COMPREHEN METABOLIC PANEL: CPT

## 2018-07-21 LAB — CANCER AG19-9 SERPL-ACNC: 35 U/ML

## 2018-07-23 ENCOUNTER — TELEPHONE (OUTPATIENT)
Dept: FAMILY MEDICINE | Facility: CLINIC | Age: 68
End: 2018-07-23

## 2018-07-23 DIAGNOSIS — C23 GALLBLADDER CANCER: Primary | ICD-10-CM

## 2018-07-23 RX ORDER — HYDROCODONE BITARTRATE AND ACETAMINOPHEN 5; 325 MG/1; MG/1
1 TABLET ORAL EVERY 12 HOURS PRN
Qty: 60 TABLET | Refills: 0 | Status: SHIPPED | OUTPATIENT
Start: 2018-07-23

## 2018-07-23 NOTE — TELEPHONE ENCOUNTER
----- Message from Charo Calloway sent at 7/23/2018  1:43 PM CDT -----  Contact: Karen/ Daughter/ [phone]  Daughter calling to ask for RX for pain medication. Says he is in a lot of pain. Please call to advise. Thank you.  .  [id number] - [place], [place] - [address] [place] [address]  Phone: [phone] Fax: [phone]

## 2018-07-27 ENCOUNTER — HOSPITAL ENCOUNTER (OUTPATIENT)
Facility: HOSPITAL | Age: 68
Discharge: HOME | End: 2018-07-28
Attending: EMERGENCY MEDICINE | Admitting: EMERGENCY MEDICINE
Payer: MEDICARE

## 2018-07-27 ENCOUNTER — OFFICE VISIT (OUTPATIENT)
Dept: HEMATOLOGY/ONCOLOGY | Facility: CLINIC | Age: 68
End: 2018-07-27
Payer: MEDICARE

## 2018-07-27 VITALS
BODY MASS INDEX: 25.98 KG/M2 | DIASTOLIC BLOOD PRESSURE: 52 MMHG | SYSTOLIC BLOOD PRESSURE: 89 MMHG | HEART RATE: 94 BPM | TEMPERATURE: 98 F | WEIGHT: 181.44 LBS | HEIGHT: 70 IN | OXYGEN SATURATION: 96 %

## 2018-07-27 DIAGNOSIS — G89.3 NEOPLASM RELATED PAIN: ICD-10-CM

## 2018-07-27 DIAGNOSIS — C23 PRIMARY CANCER OF GALLBLADDER WITH METASTASIS TO OTHER SITE: Primary | ICD-10-CM

## 2018-07-27 DIAGNOSIS — I95.9 HYPOTENSION: ICD-10-CM

## 2018-07-27 DIAGNOSIS — E11.22 TYPE 2 DIABETES MELLITUS WITH STAGE 3 CHRONIC KIDNEY DISEASE, WITHOUT LONG-TERM CURRENT USE OF INSULIN: Chronic | ICD-10-CM

## 2018-07-27 DIAGNOSIS — R03.1 BLOOD PRESSURE DECREASED: ICD-10-CM

## 2018-07-27 DIAGNOSIS — R53.1 WEAKNESS: ICD-10-CM

## 2018-07-27 DIAGNOSIS — E11.22 CONTROLLED TYPE 2 DIABETES MELLITUS WITH CHRONIC KIDNEY DISEASE, UNSPECIFIED CKD STAGE, UNSPECIFIED WHETHER LONG TERM INSULIN USE: ICD-10-CM

## 2018-07-27 DIAGNOSIS — N18.30 CKD (CHRONIC KIDNEY DISEASE), STAGE III: ICD-10-CM

## 2018-07-27 DIAGNOSIS — E16.2 HYPOGLYCEMIA: Primary | ICD-10-CM

## 2018-07-27 DIAGNOSIS — R47.81 SLURRED SPEECH: ICD-10-CM

## 2018-07-27 DIAGNOSIS — C78.7 METASTASES TO THE LIVER: ICD-10-CM

## 2018-07-27 DIAGNOSIS — N18.30 TYPE 2 DIABETES MELLITUS WITH STAGE 3 CHRONIC KIDNEY DISEASE, WITHOUT LONG-TERM CURRENT USE OF INSULIN: Chronic | ICD-10-CM

## 2018-07-27 LAB
ALBUMIN SERPL BCP-MCNC: 3.1 G/DL
ALP SERPL-CCNC: 316 U/L
ALT SERPL W/O P-5'-P-CCNC: 16 U/L
ANION GAP SERPL CALC-SCNC: 13 MMOL/L
AST SERPL-CCNC: 50 U/L
BACTERIA #/AREA URNS HPF: NORMAL /HPF
BASOPHILS # BLD AUTO: 0.02 K/UL
BASOPHILS NFR BLD: 0.2 %
BILIRUB SERPL-MCNC: 0.9 MG/DL
BILIRUB UR QL STRIP: NEGATIVE
BUN SERPL-MCNC: 24 MG/DL
CALCIUM SERPL-MCNC: 10.1 MG/DL
CHLORIDE SERPL-SCNC: 101 MMOL/L
CLARITY UR: CLEAR
CO2 SERPL-SCNC: 22 MMOL/L
COLOR UR: YELLOW
CREAT SERPL-MCNC: 1.7 MG/DL
DIFFERENTIAL METHOD: ABNORMAL
EOSINOPHIL # BLD AUTO: 0.2 K/UL
EOSINOPHIL NFR BLD: 2.5 %
ERYTHROCYTE [DISTWIDTH] IN BLOOD BY AUTOMATED COUNT: 15.1 %
EST. GFR  (AFRICAN AMERICAN): 47 ML/MIN/1.73 M^2
EST. GFR  (NON AFRICAN AMERICAN): 41 ML/MIN/1.73 M^2
GLUCOSE SERPL-MCNC: 64 MG/DL
GLUCOSE UR QL STRIP: NEGATIVE
HCT VFR BLD AUTO: 27 %
HGB BLD-MCNC: 8.7 G/DL
HGB UR QL STRIP: NEGATIVE
HYALINE CASTS #/AREA URNS LPF: 0 /LPF
KETONES UR QL STRIP: NEGATIVE
LACTATE SERPL-SCNC: 1.2 MMOL/L
LEUKOCYTE ESTERASE UR QL STRIP: NEGATIVE
LYMPHOCYTES # BLD AUTO: 1.3 K/UL
LYMPHOCYTES NFR BLD: 15.3 %
MAGNESIUM SERPL-MCNC: 1.9 MG/DL
MCH RBC QN AUTO: 20.4 PG
MCHC RBC AUTO-ENTMCNC: 32.2 G/DL
MCV RBC AUTO: 63 FL
MICROSCOPIC COMMENT: NORMAL
MONOCYTES # BLD AUTO: 1.1 K/UL
MONOCYTES NFR BLD: 12.3 %
NEUTROPHILS # BLD AUTO: 6 K/UL
NEUTROPHILS NFR BLD: 69.1 %
NITRITE UR QL STRIP: NEGATIVE
PH UR STRIP: 5 [PH] (ref 5–8)
PLATELET # BLD AUTO: 513 K/UL
PMV BLD AUTO: 9.7 FL
POCT GLUCOSE: 143 MG/DL (ref 70–110)
POCT GLUCOSE: 59 MG/DL (ref 70–110)
POTASSIUM SERPL-SCNC: 4.8 MMOL/L
PROT SERPL-MCNC: 8.2 G/DL
PROT UR QL STRIP: ABNORMAL
RBC # BLD AUTO: 4.26 M/UL
RBC #/AREA URNS HPF: 1 /HPF (ref 0–4)
SODIUM SERPL-SCNC: 136 MMOL/L
SP GR UR STRIP: 1.01 (ref 1–1.03)
SQUAMOUS #/AREA URNS HPF: 3 /HPF
TROPONIN I SERPL DL<=0.01 NG/ML-MCNC: <0.006 NG/ML
URN SPEC COLLECT METH UR: ABNORMAL
UROBILINOGEN UR STRIP-ACNC: NEGATIVE EU/DL
WBC # BLD AUTO: 8.67 K/UL
WBC #/AREA URNS HPF: 2 /HPF (ref 0–5)

## 2018-07-27 PROCEDURE — 25000003 PHARM REV CODE 250: Performed by: EMERGENCY MEDICINE

## 2018-07-27 PROCEDURE — 96372 THER/PROPH/DIAG INJ SC/IM: CPT

## 2018-07-27 PROCEDURE — 81000 URINALYSIS NONAUTO W/SCOPE: CPT

## 2018-07-27 PROCEDURE — 96361 HYDRATE IV INFUSION ADD-ON: CPT

## 2018-07-27 PROCEDURE — 80053 COMPREHEN METABOLIC PANEL: CPT

## 2018-07-27 PROCEDURE — 99999 PR PBB SHADOW E&M-EST. PATIENT-LVL IV: CPT | Mod: PBBFAC,,, | Performed by: INTERNAL MEDICINE

## 2018-07-27 PROCEDURE — 3074F SYST BP LT 130 MM HG: CPT | Mod: CPTII,S$GLB,, | Performed by: INTERNAL MEDICINE

## 2018-07-27 PROCEDURE — 96365 THER/PROPH/DIAG IV INF INIT: CPT

## 2018-07-27 PROCEDURE — 82962 GLUCOSE BLOOD TEST: CPT | Mod: 91

## 2018-07-27 PROCEDURE — 96374 THER/PROPH/DIAG INJ IV PUSH: CPT

## 2018-07-27 PROCEDURE — 93010 ELECTROCARDIOGRAM REPORT: CPT | Mod: ,,, | Performed by: INTERNAL MEDICINE

## 2018-07-27 PROCEDURE — 96366 THER/PROPH/DIAG IV INF ADDON: CPT

## 2018-07-27 PROCEDURE — 99214 OFFICE O/P EST MOD 30 MIN: CPT | Mod: S$GLB,,, | Performed by: INTERNAL MEDICINE

## 2018-07-27 PROCEDURE — 25000003 PHARM REV CODE 250: Performed by: PHYSICIAN ASSISTANT

## 2018-07-27 PROCEDURE — 96376 TX/PRO/DX INJ SAME DRUG ADON: CPT

## 2018-07-27 PROCEDURE — 85025 COMPLETE CBC W/AUTO DIFF WBC: CPT

## 2018-07-27 PROCEDURE — G0378 HOSPITAL OBSERVATION PER HR: HCPCS

## 2018-07-27 PROCEDURE — 99285 EMERGENCY DEPT VISIT HI MDM: CPT | Mod: 25

## 2018-07-27 PROCEDURE — 84484 ASSAY OF TROPONIN QUANT: CPT

## 2018-07-27 PROCEDURE — 3078F DIAST BP <80 MM HG: CPT | Mod: CPTII,S$GLB,, | Performed by: INTERNAL MEDICINE

## 2018-07-27 PROCEDURE — 83605 ASSAY OF LACTIC ACID: CPT

## 2018-07-27 PROCEDURE — 83735 ASSAY OF MAGNESIUM: CPT

## 2018-07-27 RX ORDER — DEXTROSE 50 % IN WATER (D50W) INTRAVENOUS SYRINGE
25
Status: COMPLETED | OUTPATIENT
Start: 2018-07-27 | End: 2018-07-27

## 2018-07-27 RX ADMIN — SODIUM CHLORIDE 1000 ML: 0.9 INJECTION, SOLUTION INTRAVENOUS at 07:07

## 2018-07-27 RX ADMIN — DEXTROSE MONOHYDRATE 25 G: 500 INJECTION PARENTERAL at 05:07

## 2018-07-27 RX ADMIN — SODIUM CHLORIDE 1000 ML: 0.9 INJECTION, SOLUTION INTRAVENOUS at 04:07

## 2018-07-27 NOTE — ED TRIAGE NOTES
Pt seen oncologist today, they checked bp and told them to come in. Pt accompanied niece, jose alfredo mckeon. Reports weakness. Denies dizzy, chest pain, SOB, fall, sycope

## 2018-07-27 NOTE — PROGRESS NOTES
Subjective:       Patient ID: Evens Aguilar is a 68 y.o. male.    Chief Complaint: Follow-up    HPI   HISTORY OF PRESENT ILLNESS:  The patient is a 67-year-old gentleman seen today for followup for  gallbladder carcinoma. Her presented with   upper epigastric pain and shortness of breath.Labs reveal elevated LFT.  CT revealed no signs of obstruction.  HIDA scan consistent with acute cholecystitis.  The patient was treated with IV antibiotictherapy.  Surgery following the patient.  He underwent an open cholecystectomy.Pathology revealed adenocarcinoma T2NO( 1 LN resected). CT of the chest revealed a nodule concerning for met.Pt did not undergo planned CT guided bx of lung - review of lesion non-malignant  The patient had abnormal cholangiogram on surgery.  It was determined patient is not a candidate for aggressive surgical resection at this time. S/p EUS/EGD. Liver lesion. Benign.  Plan was for ERCP with spyglass to further evaluate duct. Pt declined.Pt also declined/not interested in undergoing chemo.        Pt poor historian   BP decreased today in clinic  SBP in 70's-80's     Pt accompanied by his family  He has anorexia and wt loss  He reports generalized weaknes  Pt recently prescribed pain meds for abd pain   He has anorexia and wt loss  He reports generalized weakness          Past Medical History:   Diagnosis Date    Alpha thalassemia     Asthma     CKD (chronic kidney disease) stage 3, GFR 30-59 ml/min     COPD (chronic obstructive pulmonary disease)     Diabetes mellitus     Diabetes mellitus type II     Hx-TIA (transient ischemic attack)     Hyperlipemia     Hypertension     Obesity     Sickle cell trait        Past Surgical History:   Procedure Laterality Date    CHOLECYSTECTOMY  2016 Sept.    LUNG SURGERY      chest tube placement d/t collapsed lung     SOCIAL HISTORY:  He is a former smoker.  He has a 15-pack-year history.  ETOH use recently.  No history of IV drug abuse.  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    SOCIAL HISTORY:  He is a former smoker.  He has a 15-pack-year history.  ETOH   use recently.  No history of IV drug abuse.  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 Negative for chills, diaphoresis and fever.   HENT: Negative for congestion, hearing loss, mouth sores and nosebleeds.    Eyes: Negative for visual disturbance.   Respiratory: Negative for cough, chest tightness, shortness of breath and wheezing.    Cardiovascular: Negative for chest pain, palpitations and leg swelling.   Gastrointestinal: Positive for abdominal pain. Negative for abdominal distention, blood in stool, diarrhea, nausea and vomiting.   Genitourinary: Negative for dysuria, flank pain, frequency, hematuria and urgency.   Musculoskeletal: Negative for arthralgias, gait problem and joint swelling.   Skin: Negative for rash.        No ecchymoses, petechiae   Neurological: Positive for weakness. Negative for dizziness, tremors, seizures, syncope, light-headedness, numbness and headaches.   Hematological: Negative for adenopathy. Does not bruise/bleed easily.   Psychiatric/Behavioral: Negative for confusion. The patient is not nervous/anxious.        Objective:     ECOG 0  Vitals:    07/27/18 1544 07/27/18 1550 07/27/18 1551   BP: (!) 74/64 (!) 72/38 (!) 89/52   BP Location: Right arm Right arm Left arm   Patient Position: Sitting Sitting Sitting   BP Method: Medium (Automatic) Medium (Manual) Medium (Automatic)   Pulse: 94     Temp: 97.7 °F (36.5 °C)     TempSrc: Oral     SpO2: 96%     Weight: 82.3 kg (181 lb 7 oz)     Height: 5' 10" (1.778 m)         Physical Exam   Constitutional: He is oriented to person, place, and time. He appears well-developed and well-nourished.   HENT:   Head: Normocephalic.   Mouth/Throat: " Oropharynx is clear and moist. No oropharyngeal exudate.   Eyes: No scleral icterus.   Neck: Normal range of motion. Neck supple. No thyromegaly present.   Cardiovascular: Normal rate, regular rhythm and normal heart sounds.    No murmur heard.  Pulmonary/Chest: Effort normal and breath sounds normal. He has no wheezes. He has no rales.   Abdominal: Soft. Bowel sounds are normal. He exhibits no distension and no mass. There is no hepatosplenomegaly. There is no tenderness. There is no rebound and no guarding.   Musculoskeletal: Normal range of motion. He exhibits no edema.   Lymphadenopathy:     He has no cervical adenopathy.     He has no axillary adenopathy.        Right: No supraclavicular adenopathy present.        Left: No supraclavicular adenopathy present.   Neurological: He is alert and oriented to person, place, and time. No cranial nerve deficit.   Skin: No rash noted. No erythema.   Psychiatric: He has a normal mood and affect.           HIDA suggestive of acute cholecystitis    Cholangiogram  Multiple radiographs demonstrate contrast injected into the cystic duct and extending into the extrahepatic common bile duct as well as intrahepatic biliary ducts.  No filling defects are noted.  There is extension of the contrast into the duodenum    CT chest There is a 1.7 cm noncalcified left lung base pulmonary nodule concerning for metastatic disease.    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 Acute necrotizing  cholecystitis (gangrenous cholecystitis). See synoptic report.  Synoptic report-carcinoma of the gallbladder.  Specimen-gallbladder  Procedure-simple cholecystectomy  Tumor site-body and fundus  Tumor size-3.5 cm in greatest dimension  Histologic type- adenocarcinoma, papillary adenocarcinoma  Histologic grade-G2: Moderately differentiated  Microscopic tumor extension-tumor invades perimuscular connective tissue.  Margins-cannot be  assessed. (A sections submitted as gallbladder neck margin consistent of a medium -sized  artery and surrounding inflamed fibrofatty tissue. No gallbladder mucosa and/or muscular wall is present for  evaluation). The specimen was reexamined grossly, however, the surgical margin was not able to be identified with  certainty.  Pathologic staging-pT2.  Regional lymph nodes-pN0. One lymph node examined. 0 lymph nodes involved (1 of 1 lymph nodes are free of  tumor)  Additional pathologic findings-acute gangrenous cholecystitis.  Results for DELVIS ANAND (MRN [id number]) as of 7/10/2017 14:05   Ref. Range 1/30/2017 14:46 4/4/2017 14:35 6/30/2017 09:16   [facility] Latest Ref Range: 0.0 - 5.0 ng/mL 3.4  3.2     CT a/p w/out contrast 6/30/2017  Prior cholecystectomy.    Stable 1.6 cm left lower lobe pulmonary nodule.    Stable 1.5 cm lucent lesion in the L4 vertebral body, likely a hemangioma. No new lytic or blastic lesions identified.    Prostatomegaly with evidence of chronic outlet obstruction.    Small bowel containing umbilical hernia.    Mild colonic diverticulosis.    Results for DELVIS ANAND (MRN [id number]) as of 9/12/2017 15:37   Ref. Range 1/30/2017 14:46 4/4/2017 14:35 6/30/2017 09:16 6/30/2017 10:03 9/11/2017 10:50   [facility] Latest Ref Range: 0.0 - 5.0 ng/mL 3.4  3.2  2.9       Assessment:       1. 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 Blood pressure decreased    4. Weakness    5. Neoplasm related pain        Plan:   Pt clinically stable  67-year-old with adenocarcinoma of the gallbladder, pT2 pN0( 1 LN removed) s/p  open cholecystectomy  10/06/2016.    S/p  EUS/EGD 11/2016    FNA of liver - benign.   Pt declined/not interested in adjuvant chemotherapy  Recent CT imaging -disease progression    Discussed CT imaging findings which reveals disease progression  Consult palliative care nurse discuss end-of-life goals     Refer to ED for evaluation      Follow-up 1 mos with [facility], [facility] , [facility] 19-9 prior to f/u       CC:Anahy Matthew MD

## 2018-07-27 NOTE — ED PROVIDER NOTES
Encounter Date: 7/27/2018  SORT: This is a 68 y.o. male with gall bladder cancer with mets, DM, HTN, HLD, CKD stage 3, sickle cell trait, alpha thalaseemia, COPD, asthma who presents for emergent consideration of hypotension. Patient's daughter reports patient is weak, non-ambulatory and not acting himself. Patient was at oncology office and was told to come to ED for hypotension.  Initial exam: hypotensive, patient appears confused. Patient will be moved to a room when one is available. Orders placed.  PABLO Atkinson, TRAM     SCRIBE #1 NOTE: IAndrew, am scribing for, and in the presence of, Mt Tucker MD.       History     Chief Complaint   Patient presents with    Hypotension     Pt was at oncology clinic and was sent down for low BP, weakness. Denies pain     CC: Hypotension    HPI: This 68 y.o M with Alpha thalassemia; Asthma; CKD stage 3, GFR 30-59 ml/min; COPD;DM type II; TIA; HLD; HTN; Obesity; and Sickle cell trait presents to the ED from oncology clinic c/o hypotension. The pt reports associated generalized weakness and decreased appetite. He is currently Rx'ed Norvasc 10mg, Imdur 30mg and lisinopril 2.5mg. Per daughter, the his oncologist want to adjust his doses. The pt did not eat today, and only drank a soda. He denies fever, chills, diaphoresis, emesis, constipation, dysuria, difficulty urinating, hematuria, abdominal pain, chest pain and SOB. No prior tx.         The history is provided by the patient. No  was used.     Review of patient's allergies indicates:  No Known Allergies  Past Medical History:   Diagnosis Date    Alpha thalassemia     Asthma     CKD (chronic kidney disease) stage 3, GFR 30-59 ml/min     COPD (chronic obstructive pulmonary disease)     Diabetes mellitus     Diabetes mellitus type II     Hx-TIA (transient ischemic attack)     Hyperlipemia     Hypertension     Obesity     Sickle cell trait      Past Surgical History:   Procedure  Laterality Date    CHOLECYSTECTOMY  2016 Sept.    LUNG SURGERY      chest tube placement d/t collapsed lung     Family History   Problem Relation Age of Onset    Hypertension Mother     Stroke Father     Hypertension Sister     Diabetes Sister     Amblyopia Neg Hx     Blindness Neg Hx     Cataracts Neg Hx     Glaucoma Neg Hx     Macular degeneration Neg Hx     Retinal detachment Neg Hx     Strabismus Neg Hx      Social History   Substance Use Topics    Smoking status: Former Smoker     Packs/day: 0.50     Years: 32.00     Quit date: 12/3/1999    Smokeless tobacco: Never Used    Alcohol use 3.6 oz/week     6 Cans of beer per week      Comment: 6 weeks ago     Review of Systems   Constitutional: Positive for appetite change (decreased). Negative for chills, diaphoresis and fever.   HENT: Negative for rhinorrhea and sore throat.    Eyes: Negative for redness.   Respiratory: Negative for cough and shortness of breath.    Cardiovascular: Negative for chest pain and leg swelling.        (+) hypotension   Gastrointestinal: Negative for abdominal pain, diarrhea, nausea and vomiting.   Genitourinary: Negative for dysuria.   Musculoskeletal: Negative for back pain.   Skin: Negative for color change.   Neurological: Positive for weakness (generalized). Negative for syncope.   Psychiatric/Behavioral: The patient is not nervous/anxious.        Physical Exam     Initial Vitals [07/27/18 1620]   BP Pulse Resp Temp SpO2   (!) 83/52 80 16 97.7 °F (36.5 °C) 97 %      MAP       --         Physical Exam    Nursing note and vitals reviewed.  Constitutional: He appears well-developed and well-nourished.   HENT:   Head: Normocephalic and atraumatic.   Tongue dry   Eyes: EOM are normal. Pupils are equal, round, and reactive to light.   Neck: Normal range of motion. No JVD present.   Cardiovascular: Normal rate and regular rhythm.   Pulmonary/Chest: Breath sounds normal. No respiratory distress. He has no wheezes.    Abdominal: Soft. Bowel sounds are normal. He exhibits no distension.   Musculoskeletal: Normal range of motion. He exhibits no edema or tenderness.   Neurological: He is alert and oriented to person, place, and time. He has normal strength. No cranial nerve deficit.   Skin: Skin is warm and dry. Capillary refill takes more than 3 seconds. No erythema.   Psychiatric: He has a normal mood and affect. Thought content normal.         ED Course   Procedures  Labs Reviewed   CBC W/ AUTO DIFFERENTIAL - Abnormal; Notable for the following:        Result Value    RBC 4.26 (*)     Hemoglobin 8.7 (*)     Hematocrit 27.0 (*)     MCV 63 (*)     MCH 20.4 (*)     RDW 15.1 (*)     Platelets 513 (*)     Mono # 1.1 (*)     Lymph% 15.3 (*)     All other components within normal limits   COMPREHENSIVE METABOLIC PANEL - Abnormal; Notable for the following:     CO2 22 (*)     Glucose 64 (*)     BUN, Bld 24 (*)     Creatinine 1.7 (*)     Albumin 3.1 (*)     Alkaline Phosphatase 316 (*)     AST 50 (*)     eGFR if  47 (*)     eGFR if non  41 (*)     All other components within normal limits   URINALYSIS - Abnormal; Notable for the following:     Protein, UA 1+ (*)     All other components within normal limits   POCT GLUCOSE - Abnormal; Notable for the following:     POCT Glucose 59 (*)     All other components within normal limits   POCT GLUCOSE - Abnormal; Notable for the following:     POCT Glucose 143 (*)     All other components within normal limits   LACTIC ACID, PLASMA   MAGNESIUM   TROPONIN I   URINALYSIS MICROSCOPIC   POCT GLUCOSE MONITORING CONTINUOUS     EKG Readings: (Independently Interpreted)   EKG  17:10. Nsr, iRBBB, nost elevation. No t wave abnormality. No stemi       Imaging Results          X-Ray Chest AP Portable (Final result)  Result time 07/27/18 17:58:03    Final result by Fito Saunders MD (07/27/18 17:58:03)                 Impression:      1. Grossly stable chronic interstitial  findings noting postsurgical changes.  No large focal consolidation.      Electronically signed by: Fito Saunders MD  Date:    07/27/2018  Time:    17:58             Narrative:    EXAMINATION:  XR CHEST AP PORTABLE    CLINICAL HISTORY:  weakness;    TECHNIQUE:  Single frontal view of the chest was performed.    COMPARISON:  CT 10/10/2016, radiograph 10/10/2016    FINDINGS:  The cardiomediastinal silhouette is not enlarged.  There is elevation of the right hemidiaphragm, stable..  There is slight obscuration of the right costophrenic angle, may reflect small effusion or atelectasis..  The trachea is midline.  The lungs are symmetrically expanded bilaterally with postsurgical changes projected over the right upper lung zones, with scattered regions of bandlike atelectasis or scarring, grossly similar to the previous exam.  There is coarse interstitial attenuation bilaterally.  There is mild basilar subsegmental atelectasis on the right..  Previous identified nodule within the left lower lobe appears grossly stable as compared to the previous CT.  There is no pneumothorax.  The osseous structures are remarkable for degenerative changes..                                 Medical Decision Making:   Clinical Tests:   Lab Tests: Reviewed and Ordered  Radiological Study: Reviewed and Ordered  Medical Tests: Reviewed and Ordered  ED Management:  Weakness  - hpi and exam c/w dehydration  - bp low on arrival  - giving IV fluids  - will check for hypoglycemia, lacy, dehydration, electrolyte abnormalities, cardiac changes. Neuro intact so don't think stroke. Also looking for anemia. No report of bleeding when asked    bg low. Given d50 and po food. Pending other labs    cxr reassuring and labs c/w old labs other than hgb has slightly dropped. Asked to do rectal exam but patient stated no. He states no bleeding at all. Could be due to cancer. bp increased with 1 L NS    Blood pressure dropped again and given another liter of  fluids. BG systolic increased to 90s-100s. Concerned that due to decreased po intake and po meds that have 24 hour duration patient's bp will drop again. Starting on d5 drip and instructed patient to eat and will admit for observation. Spoke with Dr Rivera and admitted to dr clemens. Pt and family understand and agree with plan  [place] Attestation:   Scribe #1: I performed the above scribed service and the documentation accurately describes the services I performed. I attest to the accuracy of the note.    Attending Attestation:           Physician Attestation for Scribe:  Physician Attestation Statement for Scribe #1: I, Mt Tucker MD, reviewed documentation, as scribed by Andrew Baker in my presence, and it is both accurate and complete.                    Clinical Impression:   The primary encounter diagnosis was Hypoglycemia. A diagnosis of Hypotension was also pertinent to this visit.                             Mt Tucker MD  07/27/18 9070

## 2018-07-28 VITALS
HEIGHT: 70 IN | BODY MASS INDEX: 26.07 KG/M2 | SYSTOLIC BLOOD PRESSURE: 124 MMHG | TEMPERATURE: 98 F | OXYGEN SATURATION: 98 % | HEART RATE: 72 BPM | DIASTOLIC BLOOD PRESSURE: 64 MMHG | RESPIRATION RATE: 18 BRPM | WEIGHT: 182.13 LBS

## 2018-07-28 LAB
ALBUMIN SERPL BCP-MCNC: 2.6 G/DL
ALP SERPL-CCNC: 282 U/L
ALT SERPL W/O P-5'-P-CCNC: 14 U/L
ANION GAP SERPL CALC-SCNC: 11 MMOL/L
AST SERPL-CCNC: 42 U/L
BASOPHILS # BLD AUTO: 0.01 K/UL
BASOPHILS NFR BLD: 0.1 %
BILIRUB SERPL-MCNC: 0.7 MG/DL
BUN SERPL-MCNC: 21 MG/DL
CALCIUM SERPL-MCNC: 9.4 MG/DL
CHLORIDE SERPL-SCNC: 103 MMOL/L
CO2 SERPL-SCNC: 21 MMOL/L
CREAT SERPL-MCNC: 1.4 MG/DL
DIFFERENTIAL METHOD: ABNORMAL
EOSINOPHIL # BLD AUTO: 0.2 K/UL
EOSINOPHIL NFR BLD: 1.9 %
ERYTHROCYTE [DISTWIDTH] IN BLOOD BY AUTOMATED COUNT: 15.1 %
EST. GFR  (AFRICAN AMERICAN): 59 ML/MIN/1.73 M^2
EST. GFR  (NON AFRICAN AMERICAN): 51 ML/MIN/1.73 M^2
GLUCOSE SERPL-MCNC: 92 MG/DL
HCT VFR BLD AUTO: 25.9 %
HGB BLD-MCNC: 8.2 G/DL
LYMPHOCYTES # BLD AUTO: 1.1 K/UL
LYMPHOCYTES NFR BLD: 13.7 %
MCH RBC QN AUTO: 20.7 PG
MCHC RBC AUTO-ENTMCNC: 31.7 G/DL
MCV RBC AUTO: 65 FL
MONOCYTES # BLD AUTO: 0.8 K/UL
MONOCYTES NFR BLD: 9.6 %
NEUTROPHILS # BLD AUTO: 6.1 K/UL
NEUTROPHILS NFR BLD: 75.3 %
PLATELET # BLD AUTO: 389 K/UL
PMV BLD AUTO: 8.9 FL
POCT GLUCOSE: 102 MG/DL (ref 70–110)
POCT GLUCOSE: 115 MG/DL (ref 70–110)
POCT GLUCOSE: 116 MG/DL (ref 70–110)
POCT GLUCOSE: 126 MG/DL (ref 70–110)
POCT GLUCOSE: 80 MG/DL (ref 70–110)
POCT GLUCOSE: 81 MG/DL (ref 70–110)
POTASSIUM SERPL-SCNC: 4.3 MMOL/L
PROT SERPL-MCNC: 7.2 G/DL
RBC # BLD AUTO: 3.96 M/UL
SODIUM SERPL-SCNC: 135 MMOL/L
TROPONIN I SERPL DL<=0.01 NG/ML-MCNC: <0.006 NG/ML
TROPONIN I SERPL DL<=0.01 NG/ML-MCNC: <0.006 NG/ML
WBC # BLD AUTO: 8.22 K/UL

## 2018-07-28 PROCEDURE — 25000003 PHARM REV CODE 250: Performed by: INTERNAL MEDICINE

## 2018-07-28 PROCEDURE — 85025 COMPLETE CBC W/AUTO DIFF WBC: CPT

## 2018-07-28 PROCEDURE — 63600175 PHARM REV CODE 636 W HCPCS: Performed by: EMERGENCY MEDICINE

## 2018-07-28 PROCEDURE — 84484 ASSAY OF TROPONIN QUANT: CPT | Mod: 91

## 2018-07-28 PROCEDURE — 96372 THER/PROPH/DIAG INJ SC/IM: CPT

## 2018-07-28 PROCEDURE — 93005 ELECTROCARDIOGRAM TRACING: CPT

## 2018-07-28 PROCEDURE — 94761 N-INVAS EAR/PLS OXIMETRY MLT: CPT

## 2018-07-28 PROCEDURE — 36415 COLL VENOUS BLD VENIPUNCTURE: CPT

## 2018-07-28 PROCEDURE — G0378 HOSPITAL OBSERVATION PER HR: HCPCS

## 2018-07-28 PROCEDURE — 80053 COMPREHEN METABOLIC PANEL: CPT

## 2018-07-28 PROCEDURE — 25000003 PHARM REV CODE 250: Performed by: EMERGENCY MEDICINE

## 2018-07-28 RX ORDER — DEXTROSE MONOHYDRATE AND SODIUM CHLORIDE 5; .9 G/100ML; G/100ML
INJECTION, SOLUTION INTRAVENOUS CONTINUOUS
Status: DISCONTINUED | OUTPATIENT
Start: 2018-07-28 | End: 2018-07-28

## 2018-07-28 RX ORDER — HEPARIN SODIUM 5000 [USP'U]/ML
5000 INJECTION, SOLUTION INTRAVENOUS; SUBCUTANEOUS EVERY 8 HOURS
Status: DISCONTINUED | OUTPATIENT
Start: 2018-07-28 | End: 2018-07-28 | Stop reason: HOSPADM

## 2018-07-28 RX ORDER — HYDROCODONE BITARTRATE AND ACETAMINOPHEN 5; 325 MG/1; MG/1
1 TABLET ORAL EVERY 12 HOURS PRN
Status: DISCONTINUED | OUTPATIENT
Start: 2018-07-28 | End: 2018-07-28 | Stop reason: HOSPADM

## 2018-07-28 RX ADMIN — DEXTROSE AND SODIUM CHLORIDE: 5; .9 INJECTION, SOLUTION INTRAVENOUS at 10:07

## 2018-07-28 RX ADMIN — HEPARIN SODIUM 5000 UNITS: 5000 INJECTION, SOLUTION INTRAVENOUS; SUBCUTANEOUS at 06:07

## 2018-07-28 RX ADMIN — DEXTROSE AND SODIUM CHLORIDE: 5; .9 INJECTION, SOLUTION INTRAVENOUS at 01:07

## 2018-07-28 RX ADMIN — HEPARIN SODIUM 5000 UNITS: 5000 INJECTION, SOLUTION INTRAVENOUS; SUBCUTANEOUS at 04:07

## 2018-07-28 RX ADMIN — HYDROCODONE BITARTRATE AND ACETAMINOPHEN 1 TABLET: 5; 325 TABLET ORAL at 08:07

## 2018-07-28 NOTE — NURSING
Discharge orders received.  IV discontinued without complaint, catheter intact.  Telemetry monitoring discontinued.    Patient AAO x 4.  Currently denies pain, nausea, or SOB on RA at rest.  Patient reports MEJÍA.  VS stable.  Respirations even, unlabored.  No distress noted.     Steady gait with ambulation.  No falls or harm noted during stay.

## 2018-07-28 NOTE — HOSPITAL COURSE
Initially started on Dextrose infusion to support patient glucose levels. His poor appetite thought to be related to dehydration that responded well to IV fluids, his creatine improved and normalized. Hypoglycemia likely related to sulfonylurea; patient states he does not take glucose medication, however, it is on his list. Discontinued while hospitalized, his creatine improved and appetite improved as dehydration improved. Discontinue home glipizide and monitor glucose levels. Family to keep a diary and bring to MD at next visit. Last 2 levels 92/102/126 respectfully. Stable for discharge.

## 2018-07-28 NOTE — PLAN OF CARE
Problem: Diabetes, Type 2 (Adult)  Intervention: Support/Optimize Psychosocial Response to Condition   18   Coping/Psychosocial Interventions   Supportive Measures active listening utilized;verbalization of feelings encouraged   Environmental Support calm environment promoted;rest periods encouraged     Intervention: Optimize Glycemic Control   18   Nutrition Interventions   Glycemic Management blood glucose monitoring;carbohydrate replacement provided       Goal: Signs and Symptoms of Listed Potential Problems Will be Absent, Minimized or Managed (Diabetes, Type 2)  Signs and symptoms of listed potential problems will be absent, minimized or managed by discharge/transition of care (reference Diabetes, Type 2 (Adult) CPG).   Outcome: Ongoing (interventions implemented as appropriate)   18   Diabetes, Type 2   Problems Assessed (Type 2 Diabetes) all   Problems Present (Type 2 Diabetes) hypoglycemia       Problem: Fall Risk (Adult)  Intervention: Monitor/Assist with Self Care   18 0300   Functional Level Current   Ambulation 2 - assistive person   Transferring 2 - assistive person   Toileting 2 - assistive person   Bathing 2 - assistive person   Dressing 2 - assistive person   Eating 0 - independent   Communication 0 - understands/communicates without difficulty   Swallowing 0 - swallows foods/liquids without difficulty   Daily Care Interventions   Self-Care Promotion independence encouraged;BADL personal routines maintained;BADL personal objects within reach;safe use of adaptive equipment encouraged   Activity   Activity Assistance Provided assistance, stand-by     Intervention: Reduce Risk/Promote Restraint Free Environment   18   Safety Interventions   Environmental Safety Modification room near unit station;clutter free environment maintained   Prevent  Drop/Fall   Safety/Security Measures bed alarm set     Intervention: Review Medications/Identify  Contributors to Fall Risk   07/28/18 0612   Safety Interventions   Medication Review/Management medications reviewed     Intervention: Safety Promotion/Fall Prevention   07/28/18 0500   Safety Interventions   Safety Promotion/Fall Prevention bed alarm set;commode/urinal/bedpan at bedside;diversional activities provided;Fall Risk reviewed with patient/family;Fall Risk signage in place;lighting adjusted;medications reviewed;muscle strengthening facilitated;nonskid shoes/socks when out of bed;room near unit station;side rails raised x 2;supervised activity;instructed to call staff for mobility       Goal: Identify Related Risk Factors and Signs and Symptoms  Related risk factors and signs and symptoms are identified upon initiation of Human Response Clinical Practice Guideline (CPG)   Outcome: Ongoing (interventions implemented as appropriate)   07/28/18 0612   Fall Risk   Related Risk Factors (Fall Risk) sensory deficits   Signs and Symptoms (Fall Risk) presence of risk factors     Goal: Absence of Falls  Patient will demonstrate the desired outcomes by discharge/transition of care.   Outcome: Ongoing (interventions implemented as appropriate)   07/28/18 0612   Fall Risk (Adult)   Absence of Falls making progress toward outcome

## 2018-07-28 NOTE — H&P
[facility] Medicine  History & Physical    Patient Name: Evens Aguilar  MRN: [id number]  Admission Date: 7/27/2018  Attending Physician: Howard Alex MD   Primary Care Provider: Anahy Matthew MD         Patient information was obtained from patient and ER records.     Subjective:     Principal Problem:Hypoglycemia    Chief Complaint:   Chief Complaint   Patient presents with    Hypotension     Pt was at oncology clinic and was sent down for low BP, weakness. Denies pain        HPI: Evens Aguilar is a 68 y.o. with PMHx including DMII, HTN, HLD, CKD stage 3, sickle cell trait, alpha thalaseemia, COPD, and asthma. Per patient he was brought in because his glucose was low. Associated weakness and decreased appetite. Patient's glucose level was found to be 64 serum at the time of presentation with noted decreased kidney functions. He denies chest pain, HA, new focal deficits, long trips, leg or calve pain, or changes in bowel or bladder habits.    Dehydration; creatine normalized 1.7-->1.4; glucose improved     Past Medical History:   Diagnosis Date    Alpha thalassemia     Asthma     CKD (chronic kidney disease) stage 3, GFR 30-59 ml/min     COPD (chronic obstructive pulmonary disease)     Diabetes mellitus     Diabetes mellitus type II     Hx-TIA (transient ischemic attack)     Hyperlipemia     Hypertension     Obesity     Sickle cell trait        Past Surgical History:   Procedure Laterality Date    CHOLECYSTECTOMY  2016 Sept.    LUNG SURGERY      chest tube placement d/t collapsed lung       Review of patient's allergies indicates:  No Known Allergies    No current facility-administered medications on file prior to encounter.      Current Outpatient Prescriptions on File Prior to Encounter   Medication Sig    albuterol-ipratropium (DUO-NEB) 2.5 mg-0.5 mg/3 mL nebulizer solution USE ONE AMPULE IN NEBULIZER EVERY 6 HOURS AS NEEDED FOR WHEEZING     amLODIPine (NORVASC) 10 MG tablet Take 1 tablet (10 mg total) by mouth once daily.    budesonide-formoterol 160-4.5 mcg (SYMBICORT) 160-4.5 mcg/actuation HFAA Inhale 2 puffs into the lungs every 12 (twelve) hours. GENERIC EQUIVALENT IS OKAY    folic acid (FOLVITE) 1 MG tablet TAKE ONE TABLET BY MOUTH ONCE DAILY    glimepiride (AMARYL) 1 MG tablet Take 1 tablet (1 mg total) by mouth before breakfast.    HYDROcodone-acetaminophen (NORCO) 5-325 mg per tablet Take 1 tablet by mouth every 12 (twelve) hours as needed for Pain.    hydrOXYzine pamoate (VISTARIL) 25 MG Cap TAKE ONE CAPSULE BY MOUTH IN THE EVENING AS NEEDED    isosorbide mononitrate (IMDUR) 30 MG 24 hr tablet TAKE 1 TABLET(S) BY MOUTH daily    lisinopril (PRINIVIL,ZESTRIL) 2.5 MG tablet Take 1 tablet (2.5 mg total) by mouth once daily.    pravastatin (PRAVACHOL) 20 MG tablet Take 1 tablet (20 mg total) by mouth once daily.    tamsulosin (FLOMAX) 0.4 mg Cp24 Take 1 capsule (0.4 mg total) by mouth once daily.    blood sugar diagnostic (TRUETEST TEST STRIPS) [facility] CHECK SUGAR DAILY. TESTING STRIPS AND LANCETS    blood-glucose meter (FREESTYLE SYSTEM KIT) kit Use as instructed. TRUE TEST GLUCOMETER. NOT FREESTLYE.     Family History     Problem Relation (Age of Onset)    Diabetes Sister    Hypertension Mother, Sister    Stroke Father        Social History Main Topics    Smoking status: Former Smoker     Packs/day: 0.50     Years: 32.00     Quit date: 12/3/1999    Smokeless tobacco: Never Used    Alcohol use 14.4 oz/week     24 Cans of beer per week    Drug use: No    Sexual activity: Not on file     Review of Systems   Constitutional: Positive for appetite change. Negative for chills, diaphoresis and fever.   HENT: Negative for congestion, hearing loss, sore throat, tinnitus and trouble swallowing.    Eyes: Negative for photophobia, discharge, itching and visual disturbance.   Respiratory: Negative for apnea, cough, wheezing and stridor.     Cardiovascular: Negative for chest pain, palpitations and leg swelling.   Gastrointestinal: Negative for abdominal distention, abdominal pain, blood in stool, constipation, diarrhea and nausea.   Endocrine: Negative for polydipsia, polyphagia and polyuria.   Genitourinary: Negative for difficulty urinating, dysuria, flank pain and frequency.   Musculoskeletal: Negative for arthralgias, joint swelling and neck stiffness.   Skin: Negative for color change, rash and wound.   Neurological: Positive for dizziness and weakness. Negative for tremors, seizures, light-headedness, numbness and headaches.   Hematological: Negative for adenopathy.   Psychiatric/Behavioral: Negative for hallucinations and self-injury.     Objective:     Vital Signs (Most Recent):  Temp: 98.2 °F (36.8 °C) (07/28/18 1124)  Pulse: 70 (07/28/18 1124)  Resp: 17 (07/28/18 1124)  BP: 124/65 (07/28/18 1124)  SpO2: 98 % (07/28/18 1124) Vital Signs (24h Range):  Temp:  [97.7 °F (36.5 °C)-99 °F (37.2 °C)] 98.2 °F (36.8 °C)  Pulse:  [70-94] 70  Resp:  [16-20] 17  SpO2:  [96 %-99 %] 98 %  BP: ()/(38-67) 124/65     Weight: 82.6 kg (182 lb 1.6 oz)  Body mass index is 26.13 kg/m².    Physical Exam   Constitutional: He is oriented to person, place, and time. He appears well-developed and well-nourished. He is cooperative.   HENT:   Head: Normocephalic and atraumatic.   edentulous   Eyes: Conjunctivae, EOM and lids are normal. Pupils are equal, round, and reactive to light.   Neck: Normal range of motion and full passive range of motion without pain. Neck supple. No JVD present. No edema present. No thyroid mass present.   Cardiovascular: Normal rate, S1 normal, S2 normal and intact distal pulses.    No murmur heard.  Pulmonary/Chest: Effort normal.   Abdominal: Soft. Bowel sounds are normal. He exhibits no distension and no abdominal bruit. There is no splenomegaly or hepatomegaly. There is no tenderness. There is no CVA tenderness.   Musculoskeletal: He  exhibits tenderness. He exhibits no edema.   Lymphadenopathy:     He has no cervical adenopathy.     He has no axillary adenopathy.   Neurological: He is alert and oriented to person, place, and time. He has normal reflexes. He displays no tremor. He displays no seizure activity.   Skin: Skin is warm, dry and intact.   Psychiatric: He has a normal mood and affect. His speech is normal. Thought content normal. Cognition and memory are normal.         CRANIAL NERVES     CN III, IV, VI   Pupils are equal, round, and reactive to light.  Extraocular motions are normal.        Significant Labs:   CBC:   Recent Labs  Lab 07/27/18 1640 07/28/18  0301   WBC 8.67 8.22   HGB 8.7* 8.2*   HCT 27.0* 25.9*   * 389*     CMP:   Recent Labs  Lab 07/27/18 1640 07/28/18  0301    135*   K 4.8 4.3    103   CO2 22* 21*   GLU 64* 92   BUN 24* 21   CREATININE 1.7* 1.4   CALCIUM 10.1 9.4   PROT 8.2 7.2   ALBUMIN 3.1* 2.6*   BILITOT 0.9 0.7   ALKPHOS 316* 282*   AST 50* 42*   ALT 16 14   ANIONGAP 13 11   EGFRNONAA 41* 51*     Cardiac Markers: No results for input(s): CKMB, MYOGLOBIN, BNP, TROPISTAT in the last 48 hours.  Coagulation: No results for input(s): PT, INR, APTT in the last 48 hours.  Lactic Acid:   Recent Labs  Lab 07/27/18  1741   LACTATE 1.2     Lipase: No results for input(s): LIPASE in the last 48 hours.  Lipid Panel: No results for input(s): CHOL, HDL, LDLCALC, TRIG, CHOLHDL in the last 48 hours.  Magnesium:   Recent Labs  Lab 07/27/18  1640   MG 1.9     Troponin:   Recent Labs  Lab 07/27/18  1640 07/28/18  0301 07/28/18  0940   TROPONINI <0.006 <0.006 <0.006     TSH:   Recent Labs  Lab 07/13/18  1108   TSH 2.523     Urine Culture: No results for input(s): LABURIN in the last 48 hours.  Urine Studies:   Recent Labs  Lab 07/27/18  1924   COLORU Yellow   APPEARANCEUA Clear   PHUR 5.0   SPECGRAV 1.015   PROTEINUA 1+*   GLUCUA Negative   KETONESU Negative   BILIRUBINUA Negative   OCCULTUA Negative   NITRITE  Negative   UROBILINOGEN Negative   LEUKOCYTESUR Negative   RBCUA 1   WBCUA 2   BACTERIA Rare   SQUAMEPITHEL 3   HYALINECASTS 0       Significant Imaging:   Imaging Results          X-Ray Chest AP Portable (Final result)  Result time 07/27/18 17:58:03    Final result by Fito Saunders MD (07/27/18 17:58:03)                 Impression:      1. Grossly stable chronic interstitial findings noting postsurgical changes.  No large focal consolidation.      Electronically signed by: Fito Saunders MD  Date:    07/27/2018  Time:    17:58             Narrative:    EXAMINATION:  XR CHEST AP PORTABLE    CLINICAL HISTORY:  weakness;    TECHNIQUE:  Single frontal view of the chest was performed.    COMPARISON:  CT 10/10/2016, radiograph 10/10/2016    FINDINGS:  The cardiomediastinal silhouette is not enlarged.  There is elevation of the right hemidiaphragm, stable..  There is slight obscuration of the right costophrenic angle, may reflect small effusion or atelectasis..  The trachea is midline.  The lungs are symmetrically expanded bilaterally with postsurgical changes projected over the right upper lung zones, with scattered regions of bandlike atelectasis or scarring, grossly similar to the previous exam.  There is coarse interstitial attenuation bilaterally.  There is mild basilar subsegmental atelectasis on the right..  Previous identified nodule within the left lower lobe appears grossly stable as compared to the previous CT.  There is no pneumothorax.  The osseous structures are remarkable for degenerative changes..                                  Assessment/Plan:     * Hypoglycemia    Patient stated that his appetite has been poor; endorses that he is hungry now. Likely contributed to his dehydration and generalized overall poor endurance. His symptoms appear to have self resolved with restarting meals. He tells me the doctor discontinue his DM mediations about a year ago due to low glucose levels. Unclear if this  information is reliable due to Amaryl listed on his home medication which has the propensity to cause hypoglycemia. His glucose level has remained up since discontinuing the D5 infusion.    Hold home oral hyperglycemics if indicated - while hospitalized will use combined insulin therapy with basal and prandial insulin coverage, POCT glucose checks, hypoglycemic protocol and correction scale - HgA1c            CKD (chronic kidney disease) stage 3, GFR 30-59 ml/min    Creatine normalized with IV fluids GFR improved 47-->51          DM type 2 (diabetes mellitus, type 2)    See #1 discontinue glipizide at discharge.        HTN (hypertension)    Chronic stable well controlled disease on current regimen - continue        Slurred speech    Denies slurred speech - no indication of slurred speech during interview - if there was likely related to hypoglycemic episode that has since normalized           VTE Risk Mitigation         Ordered     IP VTE HIGH RISK PATIENT  Once      07/28/18 0240     heparin (porcine) injection 5,000 Units  Every 8 hours      07/28/18 0240             MIKE Trivedi, FNP-C  Hospitalist - Department of Hospital Medicine  [address] [place] [address]  Office [phone]; Pager [phone]

## 2018-07-28 NOTE — SUBJECTIVE & OBJECTIVE
Past Medical History:   Diagnosis Date    Alpha thalassemia     Asthma     CKD (chronic kidney disease) stage 3, GFR 30-59 ml/min     COPD (chronic obstructive pulmonary disease)     Diabetes mellitus     Diabetes mellitus type II     Hx-TIA (transient ischemic attack)     Hyperlipemia     Hypertension     Obesity     Sickle cell trait        Past Surgical History:   Procedure Laterality Date    CHOLECYSTECTOMY  2016 Sept.    LUNG SURGERY      chest tube placement d/t collapsed lung       Review of patient's allergies indicates:  No Known Allergies    No current facility-administered medications on file prior to encounter.      Current Outpatient Prescriptions on File Prior to Encounter   Medication Sig    albuterol-ipratropium (DUO-NEB) 2.5 mg-0.5 mg/3 mL nebulizer solution USE ONE AMPULE IN NEBULIZER EVERY 6 HOURS AS NEEDED FOR WHEEZING    amLODIPine (NORVASC) 10 MG tablet Take 1 tablet (10 mg total) by mouth once daily.    budesonide-formoterol 160-4.5 mcg (SYMBICORT) 160-4.5 mcg/actuation HFAA Inhale 2 puffs into the lungs every 12 (twelve) hours. GENERIC EQUIVALENT IS OKAY    folic acid (FOLVITE) 1 MG tablet TAKE ONE TABLET BY MOUTH ONCE DAILY    glimepiride (AMARYL) 1 MG tablet Take 1 tablet (1 mg total) by mouth before breakfast.    HYDROcodone-acetaminophen (NORCO) 5-325 mg per tablet Take 1 tablet by mouth every 12 (twelve) hours as needed for Pain.    hydrOXYzine pamoate (VISTARIL) 25 MG Cap TAKE ONE CAPSULE BY MOUTH IN THE EVENING AS NEEDED    isosorbide mononitrate (IMDUR) 30 MG 24 hr tablet TAKE 1 TABLET(S) BY MOUTH daily    lisinopril (PRINIVIL,ZESTRIL) 2.5 MG tablet Take 1 tablet (2.5 mg total) by mouth once daily.    pravastatin (PRAVACHOL) 20 MG tablet Take 1 tablet (20 mg total) by mouth once daily.    tamsulosin (FLOMAX) 0.4 mg Cp24 Take 1 capsule (0.4 mg total) by mouth once daily.    blood sugar diagnostic (TRUETEST TEST STRIPS) [facility] CHECK SUGAR DAILY. TESTING STRIPS  AND LANCETS    blood-glucose meter (FREESTYLE SYSTEM KIT) kit Use as instructed. TRUE TEST GLUCOMETER. NOT FREESTLYE.     Family History     Problem Relation (Age of Onset)    Diabetes Sister    Hypertension Mother, Sister    Stroke Father        Social History Main Topics    Smoking status: Former Smoker     Packs/day: 0.50     Years: 32.00     Quit date: 12/3/1999    Smokeless tobacco: Never Used    Alcohol use 14.4 oz/week     24 Cans of beer per week    Drug use: No    Sexual activity: Not on file     Review of Systems   Constitutional: Positive for appetite change. Negative for chills, diaphoresis and fever.   HENT: Negative for congestion, hearing loss, sore throat, tinnitus and trouble swallowing.    Eyes: Negative for photophobia, discharge, itching and visual disturbance.   Respiratory: Negative for apnea, cough, wheezing and stridor.    Cardiovascular: Negative for chest pain, palpitations and leg swelling.   Gastrointestinal: Negative for abdominal distention, abdominal pain, blood in stool, constipation, diarrhea and nausea.   Endocrine: Negative for polydipsia, polyphagia and polyuria.   Genitourinary: Negative for difficulty urinating, dysuria, flank pain and frequency.   Musculoskeletal: Negative for arthralgias, joint swelling and neck stiffness.   Skin: Negative for color change, rash and wound.   Neurological: Positive for dizziness and weakness. Negative for tremors, seizures, light-headedness, numbness and headaches.   Hematological: Negative for adenopathy.   Psychiatric/Behavioral: Negative for hallucinations and self-injury.     Objective:     Vital Signs (Most Recent):  Temp: 98.2 °F (36.8 °C) (07/28/18 1124)  Pulse: 70 (07/28/18 1124)  Resp: 17 (07/28/18 1124)  BP: 124/65 (07/28/18 1124)  SpO2: 98 % (07/28/18 1124) Vital Signs (24h Range):  Temp:  [97.7 °F (36.5 °C)-99 °F (37.2 °C)] 98.2 °F (36.8 °C)  Pulse:  [70-94] 70  Resp:  [16-20] 17  SpO2:  [96 %-99 %] 98 %  BP: ()/(38-67)  124/65     Weight: 82.6 kg (182 lb 1.6 oz)  Body mass index is 26.13 kg/m².    Physical Exam   Constitutional: He is oriented to person, place, and time. He appears well-developed and well-nourished. He is cooperative.   HENT:   Head: Normocephalic and atraumatic.   edentulous   Eyes: Conjunctivae, EOM and lids are normal. Pupils are equal, round, and reactive to light.   Neck: Normal range of motion and full passive range of motion without pain. Neck supple. No JVD present. No edema present. No thyroid mass present.   Cardiovascular: Normal rate, S1 normal, S2 normal and intact distal pulses.    No murmur heard.  Pulmonary/Chest: Effort normal.   Abdominal: Soft. Bowel sounds are normal. He exhibits no distension and no abdominal bruit. There is no splenomegaly or hepatomegaly. There is no tenderness. There is no CVA tenderness.   Musculoskeletal: He exhibits tenderness. He exhibits no edema.   Lymphadenopathy:     He has no cervical adenopathy.     He has no axillary adenopathy.   Neurological: He is alert and oriented to person, place, and time. He has normal reflexes. He displays no tremor. He displays no seizure activity.   Skin: Skin is warm, dry and intact.   Psychiatric: He has a normal mood and affect. His speech is normal. Thought content normal. Cognition and memory are normal.         CRANIAL NERVES     CN III, IV, VI   Pupils are equal, round, and reactive to light.  Extraocular motions are normal.        Significant Labs:   CBC:   Recent Labs  Lab 07/27/18  1640 07/28/18  0301   WBC 8.67 8.22   HGB 8.7* 8.2*   HCT 27.0* 25.9*   * 389*     CMP:   Recent Labs  Lab 07/27/18  1640 07/28/18  0301    135*   K 4.8 4.3    103   CO2 22* 21*   GLU 64* 92   BUN 24* 21   CREATININE 1.7* 1.4   CALCIUM 10.1 9.4   PROT 8.2 7.2   ALBUMIN 3.1* 2.6*   BILITOT 0.9 0.7   ALKPHOS 316* 282*   AST 50* 42*   ALT 16 14   ANIONGAP 13 11   EGFRNONAA 41* 51*     Cardiac Markers: No results for input(s): CKMB,  MYOGLOBIN, BNP, TROPISTAT in the last 48 hours.  Coagulation: No results for input(s): PT, INR, APTT in the last 48 hours.  Lactic Acid:   Recent Labs  Lab 07/27/18  1741   LACTATE 1.2     Lipase: No results for input(s): LIPASE in the last 48 hours.  Lipid Panel: No results for input(s): CHOL, HDL, LDLCALC, TRIG, CHOLHDL in the last 48 hours.  Magnesium:   Recent Labs  Lab 07/27/18  1640   MG 1.9     Troponin:   Recent Labs  Lab 07/27/18  1640 07/28/18  0301 07/28/18  0940   TROPONINI <0.006 <0.006 <0.006     TSH:   Recent Labs  Lab 07/13/18  1108   TSH 2.523     Urine Culture: No results for input(s): LABURIN in the last 48 hours.  Urine Studies:   Recent Labs  Lab 07/27/18  1924   COLORU Yellow   APPEARANCEUA Clear   PHUR 5.0   SPECGRAV 1.015   PROTEINUA 1+*   GLUCUA Negative   KETONESU Negative   BILIRUBINUA Negative   OCCULTUA Negative   NITRITE Negative   UROBILINOGEN Negative   LEUKOCYTESUR Negative   RBCUA 1   WBCUA 2   BACTERIA Rare   SQUAMEPITHEL 3   HYALINECASTS 0       Significant Imaging:   Imaging Results          X-Ray Chest AP Portable (Final result)  Result time 07/27/18 17:58:03    Final result by Fito Saunders MD (07/27/18 17:58:03)                 Impression:      1. Grossly stable chronic interstitial findings noting postsurgical changes.  No large focal consolidation.      Electronically signed by: Fito Saunders MD  Date:    07/27/2018  Time:    17:58             Narrative:    EXAMINATION:  XR CHEST AP PORTABLE    CLINICAL HISTORY:  weakness;    TECHNIQUE:  Single frontal view of the chest was performed.    COMPARISON:  CT 10/10/2016, radiograph 10/10/2016    FINDINGS:  The cardiomediastinal silhouette is not enlarged.  There is elevation of the right hemidiaphragm, stable..  There is slight obscuration of the right costophrenic angle, may reflect small effusion or atelectasis..  The trachea is midline.  The lungs are symmetrically expanded bilaterally with postsurgical changes projected  over the right upper lung zones, with scattered regions of bandlike atelectasis or scarring, grossly similar to the previous exam.  There is coarse interstitial attenuation bilaterally.  There is mild basilar subsegmental atelectasis on the right..  Previous identified nodule within the left lower lobe appears grossly stable as compared to the previous CT.  There is no pneumothorax.  The osseous structures are remarkable for degenerative changes..

## 2018-07-28 NOTE — PLAN OF CARE
07/28/18 0934   Discharge Assessment   Assessment Type Discharge Planning Assessment   Confirmed/corrected address and phone number on facesheet? Yes   Assessment information obtained from? Patient   Communicated expected length of stay with patient/caregiver yes   Prior to hospitilization cognitive status: Alert/Oriented   Prior to hospitalization functional status: Independent   Current cognitive status: Alert/Oriented   Current Functional Status: Independent   Facility Arrived From: Home   Lives With alone   Able to Return to Prior Arrangements yes   Is patient able to care for self after discharge? Yes   Who are your caregiver(s) and their phone number(s)? Neel-daughter: [phone]   Patient's perception of discharge disposition home or selfcare   Readmission Within The Last 30 Days no previous admission in last 30 days   Patient currently being followed by outpatient case management? No   Patient currently receives any other outside agency services? No   Equipment Currently Used at Home glucometer   Do you have any problems affording any of your prescribed medications? No   Is the patient taking medications as prescribed? yes   Does the patient have transportation home? Yes   Transportation Available car;family or friend will provide   Does the patient receive services at the [facility]mad[facility] Clinic? No   Discharge Plan A Home with family   Discharge Plan B Other  (TBD)   Patient/Family In Agreement With Plan yes     Patient is independent at home with spouse who assists; daughter assists also if needed; no assist; no current services; uses glucometer at home; no needs anticipated; prefers afternoon appointments    PCP: Anahy Matthew MD     Extended Emergency Contact Information  Primary Emergency Contact: Neel Aguilar  Address: [address] [place] [address] [address] [place] [address] [place]  Home Phone: [phone]  Mobile Phone: [phone]  Relation: Daughter  Secondary Emergency Contact:  GrewalLoren  Address: UNKNOWN   [facility]  Home Phone: [phone]  Mobile Phone: [phone]  Relation: Relative  Mother: Isabel Aguilar   [facility]  Home Phone: [phone]  Mobile Phone: [phone]     Payor: [employer] MEDICARE / Plan: [employer] CHOICES 65 / Product Type: Medicare Advantage /      1)  Warning signs/symptoms information reviewed with and provided to patient in blue folder  2)  Discharge planning begins upon admission   3)  Discussed and reinforced patient responsibility for managing health care at home including:        a) Acquiring prescribed medications; b) following-through with scheduled follow-up appointments or scheduling those that could not be set; and c) monitoring health at home.

## 2018-07-28 NOTE — DISCHARGE SUMMARY
[facility] Hospital Medicine  Discharge Summary      Patient Name: Evens Aguilar  MRN: [id number]  Admission Date: 7/27/2018  Hospital Length of Stay: 0 days  Discharge Date and Time:  07/28/2018 3:09 PM  Attending Physician: Howard Alex MD   Discharging Provider: RACHEL Zepeda  Primary Care Provider: Anahy Matthew MD      HPI:   Evens Aguilar is a 68 y.o. with PMHx including DMII, HTN, HLD, CKD stage 3, sickle cell trait, alpha thalaseemia, COPD, and asthma. Per patient he was brought in because his glucose was low. Associated weakness and decreased appetite. Patient's glucose level was found to be 64 serum at the time of presentation with noted decreased kidney functions. He denies chest pain, HA, new focal deficits, long trips, leg or calve pain, or changes in bowel or bladder habits.    Dehydration; creatine normalized 1.7-->1.4; glucose improved     * No surgery found *      Hospital Course:   Initially started on Dextrose infusion to support patient glucose levels. His poor appetite thought to be related to dehydration that responded well to IV fluids, his creatine improved and normalized. Hypoglycemia likely related to sulfonylurea; patient states he does not take glucose medication, however, it is on his list. Discontinued while hospitalized, his creatine improved and appetite improved as dehydration improved. Discontinue home glipizide and monitor glucose levels. Family to keep a diary and bring to MD at next visit. Last 2 levels 92/102/126 respectfully. Stable for discharge.     Consults:     No new Assessment & Plan notes have been filed under this hospital service since the last note was generated.  Service: Hospital Medicine    Final Active Diagnoses:    Diagnosis Date Noted POA    PRINCIPAL PROBLEM:  Hypoglycemia [E16.2] 07/27/2018 Yes    CKD (chronic kidney disease) stage 3, GFR 30-59 ml/min [N18.3] 08/11/2013 Yes     Chronic    HTN (hypertension)  [I10] 08/11/2013 Yes     Chronic    DM type 2 (diabetes mellitus, type 2) [E11.9] 08/11/2013 Yes     Chronic    Slurred speech [R47.81] 08/11/2013 Yes      Problems Resolved During this Admission:    Diagnosis Date Noted Date Resolved POA       Discharged Condition: stable    Disposition: Home or Self Care    Follow Up:  Follow-up Information     Anahy Matthew MD On 7/2/2018.    Specialty:  Family Medicine  Why:  1:45pm PCP hospital follow-up appointment scheduled  Contact information:  [address] [place] [address]  [phone]                 Patient Instructions:     Diet Cardiac     Activity as tolerated         Significant Diagnostic Studies: Labs:   CMP   Recent Labs  Lab 07/27/18  1640 07/28/18  0301    135*   K 4.8 4.3    103   CO2 22* 21*   GLU 64* 92   BUN 24* 21   CREATININE 1.7* 1.4   CALCIUM 10.1 9.4   PROT 8.2 7.2   ALBUMIN 3.1* 2.6*   BILITOT 0.9 0.7   ALKPHOS 316* 282*   AST 50* 42*   ALT 16 14   ANIONGAP 13 11   ESTGFRAFRICA 47* 59*   EGFRNONAA 41* 51*   , CBC   Recent Labs  Lab 07/27/18  1640 07/28/18  0301   WBC 8.67 8.22   HGB 8.7* 8.2*   HCT 27.0* 25.9*   * 389*   , INR   Lab Results   Component Value Date    INR 1.0 10/31/2016    INR 1.0 10/04/2016    INR 1.0 10/03/2016   , Lipid Panel   Lab Results   Component Value Date    CHOL 191 07/13/2018    HDL 30 (L) 07/13/2018    LDLCALC 138.4 07/13/2018    TRIG 113 07/13/2018    CHOLHDL 15.7 (L) 07/13/2018   , Troponin   Recent Labs  Lab 07/28/18  0940   TROPONINI <0.006    and A1C:   Recent Labs  Lab 07/13/18  1108   HGBA1C 6.2*       Pending Diagnostic Studies:     None         Medications:  Reconciled Home Medications:      Medication List      CONTINUE taking these medications    albuterol-ipratropium 2.5 mg-0.5 mg/3 mL nebulizer solution  Commonly known as:  DUO-NEB  USE ONE AMPULE IN NEBULIZER EVERY 6 HOURS AS NEEDED FOR WHEEZING     amLODIPine 10 MG tablet  Commonly known as:  NORVASC  Take 1 tablet (10  mg total) by mouth once daily.     blood sugar diagnostic Strp  Commonly known as:  TRUETEST TEST STRIPS  CHECK SUGAR DAILY. TESTING STRIPS AND LANCETS     blood-glucose meter kit  Commonly known as:  FREESTYLE SYSTEM KIT  Use as instructed. TRUE TEST GLUCOMETER. NOT FREESTLYE.     budesonide-formoterol 160-4.5 mcg 160-4.5 mcg/actuation Hfaa  Commonly known as:  SYMBICORT  Inhale 2 puffs into the lungs every 12 (twelve) hours. GENERIC EQUIVALENT IS OKAY     folic acid 1 MG tablet  Commonly known as:  FOLVITE  TAKE ONE TABLET BY MOUTH ONCE DAILY     HYDROcodone-acetaminophen 5-325 mg per tablet  Commonly known as:  NORCO  Take 1 tablet by mouth every 12 (twelve) hours as needed for Pain.     hydrOXYzine pamoate 25 MG Cap  Commonly known as:  VISTARIL  TAKE ONE CAPSULE BY MOUTH IN THE EVENING AS NEEDED     isosorbide mononitrate 30 MG 24 hr tablet  Commonly known as:  IMDUR  TAKE 1 TABLET(S) BY MOUTH daily     lisinopril 2.5 MG tablet  Commonly known as:  PRINIVIL,ZESTRIL  Take 1 tablet (2.5 mg total) by mouth once daily.     pravastatin 20 MG tablet  Commonly known as:  PRAVACHOL  Take 1 tablet (20 mg total) by mouth once daily.     tamsulosin 0.4 mg Cap  Commonly known as:  FLOMAX  Take 1 capsule (0.4 mg total) by mouth once daily.        STOP taking these medications    glimepiride 1 MG tablet  Commonly known as:  AMARYL            Indwelling Lines/Drains at time of discharge:   Lines/Drains/Airways          No matching active lines, drains, or airways          Time spent on the discharge of patient: 45 minutes  Patient was seen and examined on the date of discharge and determined to be suitable for discharge.       MIKE Trivedi, FNP-C  Hospitalist - Department of Hospital Medicine  [address], [place] [address]  Office [phone]; Pager [phone]

## 2018-07-28 NOTE — ASSESSMENT & PLAN NOTE
Patient stated that his appetite has been poor; endorses that he is hungry now. Likely contributed to his dehydration and generalized overall poor endurance. His symptoms appear to have self resolved with restarting meals. He tells me the doctor discontinue his DM mediations about a year ago due to low glucose levels. Unclear if this information is reliable due to Amaryl listed on his home medication which has the propensity to cause hypoglycemia. His glucose level has remained up since discontinuing the D5 infusion.    Hold home oral hyperglycemics if indicated - while hospitalized will use combined insulin therapy with basal and prandial insulin coverage, POCT glucose checks, hypoglycemic protocol and correction scale - HgA1c

## 2018-07-28 NOTE — ASSESSMENT & PLAN NOTE
Denies slurred speech - no indication of slurred speech during interview - if there was likely related to hypoglycemic episode that has since normalized

## 2018-07-28 NOTE — NURSING
Discharge instructions and health education explained and given to patient.  He verbalized understanding.  Questions and concerns addressed.   Emotional support provided.

## 2018-07-28 NOTE — HPI
Evens Aguilar is a 68 y.o. with PMHx including DMII, HTN, HLD, CKD stage 3, sickle cell trait, alpha thalaseemia, COPD, and asthma. Per patient he was brought in because his glucose was low. Associated weakness and decreased appetite. Patient's glucose level was found to be 64 serum at the time of presentation with noted decreased kidney functions. He denies chest pain, HA, new focal deficits, long trips, leg or calve pain, or changes in bowel or bladder habits.    Dehydration; creatine normalized 1.7-->1.4; glucose improved

## 2018-07-28 NOTE — NURSING
Discussed patient's vitals, orders and c/o left flank pain with Dr Rivera. T.O received to continue patient's home medicine norco 5/325 q12 hours po for pain.

## 2018-07-28 NOTE — DISCHARGE INSTRUCTIONS
Take blood glucose 3 times daily and at bedtime  Keep a blood glucose diary and bring it with you to your next primary care doctors appointment  If blood glucose levels fall below 60 eat a meal or drink orange juice and recheck every hour until above 60  If blood glucose is above 400 repeat the check with a different finger; if remains above 400 contact your doctor  If blood glucose is below 60 or above 400 consistently contact your primary care doctor    Complete medications as ordered  Follow all discharge instructions.  Please schedule follow up appointments as necessary      When to Call Your Doctor  Call your doctor immediately if you have any of the following:  · Severe headache  · Severe dizziness, or fainting  · Nausea or vomiting  · Fast heartbeat (higher than 100 beats per minute)  · Fever or chills  · Swollen ankles  · Weakness  · Chest Pain attacks that last longer, occur more often, or are more severe than in the past

## 2018-08-02 ENCOUNTER — OFFICE VISIT (OUTPATIENT)
Dept: FAMILY MEDICINE | Facility: CLINIC | Age: 68
End: 2018-08-02
Payer: MEDICARE

## 2018-08-02 VITALS
DIASTOLIC BLOOD PRESSURE: 64 MMHG | HEIGHT: 70 IN | OXYGEN SATURATION: 98 % | BODY MASS INDEX: 26.48 KG/M2 | TEMPERATURE: 98 F | SYSTOLIC BLOOD PRESSURE: 98 MMHG | HEART RATE: 81 BPM | WEIGHT: 184.94 LBS

## 2018-08-02 DIAGNOSIS — I10 ESSENTIAL HYPERTENSION: ICD-10-CM

## 2018-08-02 DIAGNOSIS — C23 METASTASIS FROM GALLBLADDER CANCER: Primary | ICD-10-CM

## 2018-08-02 DIAGNOSIS — C79.9 METASTASIS FROM GALLBLADDER CANCER: Primary | ICD-10-CM

## 2018-08-02 PROCEDURE — 3074F SYST BP LT 130 MM HG: CPT | Mod: CPTII,S$GLB,, | Performed by: FAMILY MEDICINE

## 2018-08-02 PROCEDURE — 99214 OFFICE O/P EST MOD 30 MIN: CPT | Mod: S$GLB,,, | Performed by: FAMILY MEDICINE

## 2018-08-02 PROCEDURE — 99499 UNLISTED E&M SERVICE: CPT | Mod: S$GLB,,, | Performed by: FAMILY MEDICINE

## 2018-08-02 PROCEDURE — 3078F DIAST BP <80 MM HG: CPT | Mod: CPTII,S$GLB,, | Performed by: FAMILY MEDICINE

## 2018-08-02 PROCEDURE — 99999 PR PBB SHADOW E&M-EST. PATIENT-LVL III: CPT | Mod: PBBFAC,,, | Performed by: FAMILY MEDICINE

## 2018-08-02 RX ORDER — AMLODIPINE BESYLATE 10 MG/1
5 TABLET ORAL DAILY
Qty: 90 TABLET | Refills: 1
Start: 2018-08-02

## 2018-08-02 NOTE — PROGRESS NOTES
"Subjective:       Patient ID: Evens Aguilar is a 68 y.o. male.    Chief Complaint: Hospital Follow Up    patient is her for hospital discharge after diagnosis of metastatic cancer. He was admitted to observation after low blood pressure and low blood sugar. 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 He has had follow up with the oncologist and he does not desire chemotherapy or radiation. He has been informed that no further management is available for him and tat this oint he should seek hospice care. He is here with his daughter today for this visit.       Review of Systems    Objective:        Vitals:    08/02/18 1357   BP: 98/64   Pulse: 81   Temp: 97.8 °F (36.6 °C)   TempSrc: Oral   SpO2: 98%   Weight: 83.9 kg (184 lb 15.5 oz)   Height: 5' 10" (1.778 m)       Physical Exam   Constitutional: He is oriented to person, place, and time. He appears well-developed and well-nourished. No distress.   HENT:   Head: Normocephalic and atraumatic.   Cardiovascular: Normal rate, regular rhythm and normal heart sounds.  Exam reveals no gallop and no friction rub.    No murmur heard.  Pulmonary/Chest: Effort normal and breath sounds normal. No respiratory distress. He has no wheezes. He has no rales.   Abdominal: Soft. Bowel sounds are normal. He exhibits mass. He exhibits no distension. There is no tenderness. There is no guarding.   Neurological: He is alert and oriented to person, place, and time.   Skin: He is not diaphoretic.       Assessment:       1. Metastasis from gallbladder cancer    2. Essential hypertension        Plan:       Evens was seen today for hospital follow up.    Diagnoses and all orders for this visit:    Metastasis from gallbladder cancer  -     Ambulatory referral to Hospice    Essential hypertension  -     amLODIPine (NORVASC) 10 MG tablet; Take 0.5 tablets (5 mg total) by mouth once daily.    will decrease his amlodipine from 10 to 5 mg as his blood pressure " is the low end of normal and will not require as much medication due to weight loss.

## 2018-08-07 ENCOUNTER — TELEPHONE (OUTPATIENT)
Dept: FAMILY MEDICINE | Facility: CLINIC | Age: 68
End: 2018-08-07

## 2018-08-07 NOTE — TELEPHONE ENCOUNTER
----- Message from Pratibha Spann sent at 8/7/2018  9:50 AM CDT -----  Contact: daughter-sylvie Haile's daughter calling to get an update on hospice service for her dad and she hasn't received any information. Please call back at [phone] to speak to daughter.

## 2018-08-07 NOTE — TELEPHONE ENCOUNTER
----- Message from Jennifer Morillo sent at 8/7/2018  2:02 PM CDT -----  Contact: Daughter  Neel returned call regarding pt's hospice. Contact her at [phone].    Thanks-

## 2018-08-08 ENCOUNTER — TELEPHONE (OUTPATIENT)
Dept: FAMILY MEDICINE | Facility: CLINIC | Age: 68
End: 2018-08-08

## 2018-08-15 ENCOUNTER — TELEPHONE (OUTPATIENT)
Dept: FAMILY MEDICINE | Facility: CLINIC | Age: 68
End: 2018-08-15

## 2018-08-15 NOTE — TELEPHONE ENCOUNTER
----- Message from Orlando Cordoba sent at 8/14/2018 12:01 PM CDT -----  Contact: [place] ([facility])  Name of Who is Calling: [place] ([facility])      What is the request in detail: would like to speak with staff to get demographics on the patient      Can the clinic reply by [facility]: no      What Number to Call Back if not in [facility]: [phone]

## 2018-08-29 ENCOUNTER — TELEPHONE (OUTPATIENT)
Dept: FAMILY MEDICINE | Facility: CLINIC | Age: 68
End: 2018-08-29

## 2018-08-29 NOTE — TELEPHONE ENCOUNTER
----- Message from Livier Trent sent at 8/29/2018  8:49 AM CDT -----  Contact: Daughter  Pts daughter is wanting to speak with staff regarding getting some paperwork filled out for her work that's shes taking care of pt. Please call pts daughter at [phone].

## 2018-08-29 NOTE — TELEPHONE ENCOUNTER
Daughter Neel Aguilar contacted and she wanted to inform Dr. Matthew that [facility] is going to sign the papers for her.

## 2019-07-19 DIAGNOSIS — E11.9 TYPE 2 DIABETES MELLITUS WITHOUT COMPLICATION: ICD-10-CM

## 2019-07-26 DIAGNOSIS — E11.9 TYPE 2 DIABETES MELLITUS WITHOUT COMPLICATION: ICD-10-CM

## 2019-11-01 ENCOUNTER — TELEPHONE (OUTPATIENT)
Dept: FAMILY MEDICINE | Facility: CLINIC | Age: 69
End: 2019-11-01

## 2019-11-01 NOTE — TELEPHONE ENCOUNTER
LM for patient that they're over due for their Physical, to please call our office at [phone] to schedule your appointment.

## 2020-02-14 DIAGNOSIS — E11.29 DM (DIABETES MELLITUS) TYPE II CONTROLLED WITH RENAL MANIFESTATION: ICD-10-CM

## 2020-02-28 ENCOUNTER — TELEPHONE (OUTPATIENT)
Dept: FAMILY MEDICINE | Facility: CLINIC | Age: 70
End: 2020-02-28

## 2020-04-23 ENCOUNTER — PATIENT OUTREACH (OUTPATIENT)
Dept: ADMINISTRATIVE | Facility: HOSPITAL | Age: 70
End: 2020-04-23

## 2020-05-28 ENCOUNTER — PATIENT OUTREACH (OUTPATIENT)
Dept: ADMINISTRATIVE | Facility: HOSPITAL | Age: 70
End: 2020-05-28

## 2020-06-26 ENCOUNTER — PATIENT OUTREACH (OUTPATIENT)
Dept: ADMINISTRATIVE | Facility: HOSPITAL | Age: 70
End: 2020-06-26

## 2025-02-17 NOTE — PROGRESS NOTES
>>ASSESSMENT AND PLAN FOR INTELLECTUAL DISABILITY WRITTEN ON 2/17/2025  9:14 AM BY BRINA HOLLY    Concerns for medication non-compliance.  Considerations with dispo planning.   Subjective:       Patient ID: Evens Aguilar is a 66 y.o. male.    Chief Complaint: Follow-up    HPI   HISTORY OF PRESENT ILLNESS:  The patient is a 66-year-old gentleman seen today for followup for recently diagnosed gallbladder carcinoma. Her presented with   upper epigastric pain and shortness of breath.Labs reveal elevated LFT.  CT revealed no signs of obstruction.  HIDA scan consistent with acute cholecystitis.  The patient was treated with IV antibiotictherapy.  Surgery following the patient.  He underwent an open cholecystectomy.Pathology revealed adenocarcinoma T2NO( 1 LN resected). CT of the chest revealed a nodule concerning for met.Pt did not undergo planned CT guided bx of lung - review of lesion non-malignant  The patient had abnormal cholangiogram on surgery.  It was determined patient is not a candidate for aggressive surgical resection at this time. S/p EUS/EGD. Liver lesion. Benign.  Plan was for ERCP with spyglass to further evaluate duct. Pt declined.Pt also declined/not interested in undergoing chemo.      Today, he is doing well.  No complaints  No N/V/abd pain/fevers    Appetite and weight remain stable  No SOB/CP        Past Medical History   Diagnosis Date    Alpha thalassemia     Asthma     CKD (chronic kidney disease) stage 3, GFR 30-59 ml/min     COPD (chronic obstructive pulmonary disease)     Diabetes mellitus     Diabetes mellitus type II     Hx-TIA (transient ischemic attack)     Hyperlipemia     Hypertension     Obesity     Sickle cell trait        Past Surgical History   Procedure Laterality Date    Lung surgery       chest tube placement d/t collapsed lung    Cholecystectomy  2016 Sept.     SOCIAL HISTORY:  He is a former smoker.  He has a 15-pack-year history.  ETOH use recently.  No history of IV drug abuse.  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    SOCIAL HISTORY:  He is a former smoker.  He has a 15-pack-year  "history.  ETOH   use recently.  No history of IV drug abuse.  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   HENT: Negative for congestion, hearing loss, mouth sores and nosebleeds.    Eyes: Negative for visual disturbance.   Respiratory: Negative for cough, chest tightness, shortness of breath and wheezing.    Cardiovascular: Negative for chest pain, palpitations and leg swelling.   Gastrointestinal: Negative for abdominal distention, abdominal pain, blood in stool, diarrhea, nausea and vomiting.   Genitourinary: Negative for dysuria, flank pain, frequency, hematuria and urgency.   Musculoskeletal: Negative for arthralgias, gait problem and joint swelling.   Skin: Negative for rash.        No ecchymoses, petechiae   Neurological: Negative for dizziness, tremors, seizures, syncope, weakness, light-headedness, numbness and headaches.   Hematological: Negative for adenopathy. Does not bruise/bleed easily.   Psychiatric/Behavioral: Negative for confusion. The patient is not nervous/anxious.        Objective:     ECOG 0  Vitals:    02/03/17 1504   BP: 116/62   BP Location: Left arm   Patient Position: Sitting   BP Method: Manual   Pulse: 70   Temp: 98.6 °F (37 °C)   TempSrc: Oral   SpO2: 97%   Weight: 98 kg (216 lb 0.8 oz)   Height: 5' 9" (1.753 m)       Physical Exam   Constitutional: He is oriented to person, place, and time. He appears well-developed and well-nourished.   HENT:   Head: Normocephalic.   Mouth/Throat: Oropharynx is clear and moist. No oropharyngeal exudate.   Eyes: No scleral icterus.   Neck: Normal range of motion. Neck supple. No thyromegaly present.   Cardiovascular: Normal rate, regular rhythm and normal heart sounds.    No murmur heard.  Pulmonary/Chest: Effort normal and breath sounds normal. He has no wheezes. He has no rales.   Abdominal: " Soft. Bowel sounds are normal. He exhibits no distension and no mass. There is no hepatosplenomegaly. There is no tenderness. There is no rebound and no guarding.   Musculoskeletal: Normal range of motion. He exhibits no edema.   Lymphadenopathy:     He has no cervical adenopathy.     He has no axillary adenopathy.        Right: No supraclavicular adenopathy present.        Left: No supraclavicular adenopathy present.   Neurological: He is alert and oriented to person, place, and time. No cranial nerve deficit.   Skin: No rash noted. No erythema.   Psychiatric: He has a normal mood and affect.           HIDA suggestive of acute cholecystitis    Cholangiogram  Multiple radiographs demonstrate contrast injected into the cystic duct and extending into the extrahepatic common bile duct as well as intrahepatic biliary ducts.  No filling defects are noted.  There is extension of the contrast into the duodenum    CT chest There is a 1.7 cm noncalcified left lung base pulmonary nodule concerning for metastatic disease.    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 Acute necrotizing  cholecystitis (gangrenous cholecystitis). See synoptic report.  Synoptic report-carcinoma of the gallbladder.  Specimen-gallbladder  Procedure-simple cholecystectomy  Tumor site-body and fundus  Tumor size-3.5 cm in greatest dimension  Histologic type- adenocarcinoma, papillary adenocarcinoma  Histologic grade-G2: Moderately differentiated  Microscopic tumor extension-tumor invades perimuscular connective tissue.  Margins-cannot be assessed. (A sections submitted as gallbladder neck margin consistent of a medium -sized  artery and surrounding inflamed fibrofatty tissue. No gallbladder mucosa and/or muscular wall is present for  evaluation). The specimen was reexamined grossly, however, the surgical margin was not able to be identified with  certainty.  Pathologic  staging-pT2.  Regional lymph nodes-pN0. One lymph node examined. 0 lymph nodes involved (1 of 1 lymph nodes are free of  tumor)  Additional pathologic findings-acute gangrenous cholecystitis.    Assessment:       1. Gallbladder cancer        Plan:   Pt clinically stable  66-year-old with adenocarcinoma of the gallbladder, pT2 pN0( 1 LN removed) s/p  open cholecystectomy  10/06/2016.    S/p  EUS/EGD 11/2016    FNA of liver - benign.   Pt declined ERCP  Pt declined/not interested in chemotherapy  Cont to monitor    CC:Anahy Matthew MD